# Patient Record
Sex: FEMALE | Race: WHITE | NOT HISPANIC OR LATINO | Employment: PART TIME | URBAN - NONMETROPOLITAN AREA
[De-identification: names, ages, dates, MRNs, and addresses within clinical notes are randomized per-mention and may not be internally consistent; named-entity substitution may affect disease eponyms.]

---

## 2021-05-19 ENCOUNTER — IMMUNIZATIONS (OUTPATIENT)
Dept: FAMILY MEDICINE CLINIC | Facility: HOSPITAL | Age: 41
End: 2021-05-19

## 2021-05-19 DIAGNOSIS — Z23 ENCOUNTER FOR IMMUNIZATION: Primary | ICD-10-CM

## 2021-05-19 PROCEDURE — 91301 SARS-COV-2 / COVID-19 MRNA VACCINE (MODERNA) 100 MCG: CPT

## 2021-05-19 PROCEDURE — 0011A SARS-COV-2 / COVID-19 MRNA VACCINE (MODERNA) 100 MCG: CPT

## 2021-06-17 ENCOUNTER — IMMUNIZATIONS (OUTPATIENT)
Dept: FAMILY MEDICINE CLINIC | Facility: HOSPITAL | Age: 41
End: 2021-06-17

## 2021-06-17 DIAGNOSIS — Z23 ENCOUNTER FOR IMMUNIZATION: Primary | ICD-10-CM

## 2021-06-17 PROCEDURE — 91301 SARS-COV-2 / COVID-19 MRNA VACCINE (MODERNA) 100 MCG: CPT

## 2021-06-17 PROCEDURE — 0012A SARS-COV-2 / COVID-19 MRNA VACCINE (MODERNA) 100 MCG: CPT

## 2021-10-07 ENCOUNTER — NURSE TRIAGE (OUTPATIENT)
Dept: OTHER | Facility: OTHER | Age: 41
End: 2021-10-07

## 2021-10-07 ENCOUNTER — OFFICE VISIT (OUTPATIENT)
Dept: URGENT CARE | Facility: CLINIC | Age: 41
End: 2021-10-07
Payer: COMMERCIAL

## 2021-10-07 VITALS — TEMPERATURE: 97.9 F | RESPIRATION RATE: 18 BRPM | OXYGEN SATURATION: 99 % | HEART RATE: 84 BPM | WEIGHT: 150 LBS

## 2021-10-07 DIAGNOSIS — Z20.822 EXPOSURE TO COVID-19 VIRUS: Primary | ICD-10-CM

## 2021-10-07 DIAGNOSIS — Z11.59 SCREENING FOR VIRAL DISEASE: Primary | ICD-10-CM

## 2021-10-07 PROCEDURE — 99213 OFFICE O/P EST LOW 20 MIN: CPT | Performed by: NURSE PRACTITIONER

## 2021-10-07 PROCEDURE — U0005 INFEC AGEN DETEC AMPLI PROBE: HCPCS | Performed by: NURSE PRACTITIONER

## 2021-10-07 PROCEDURE — U0003 INFECTIOUS AGENT DETECTION BY NUCLEIC ACID (DNA OR RNA); SEVERE ACUTE RESPIRATORY SYNDROME CORONAVIRUS 2 (SARS-COV-2) (CORONAVIRUS DISEASE [COVID-19]), AMPLIFIED PROBE TECHNIQUE, MAKING USE OF HIGH THROUGHPUT TECHNOLOGIES AS DESCRIBED BY CMS-2020-01-R: HCPCS | Performed by: NURSE PRACTITIONER

## 2021-10-07 RX ORDER — LORATADINE 10 MG/1
10 TABLET ORAL DAILY
COMMUNITY

## 2021-10-08 LAB — SARS-COV-2 RNA RESP QL NAA+PROBE: NEGATIVE

## 2021-12-28 ENCOUNTER — OFFICE VISIT (OUTPATIENT)
Dept: URGENT CARE | Facility: CLINIC | Age: 41
End: 2021-12-28
Payer: COMMERCIAL

## 2021-12-28 VITALS — RESPIRATION RATE: 20 BRPM | TEMPERATURE: 98.2 F | HEART RATE: 88 BPM | OXYGEN SATURATION: 98 %

## 2021-12-28 DIAGNOSIS — R68.89 FLU-LIKE SYMPTOMS: Primary | ICD-10-CM

## 2021-12-28 DIAGNOSIS — J02.9 SORE THROAT: ICD-10-CM

## 2021-12-28 LAB — S PYO AG THROAT QL: NEGATIVE

## 2021-12-28 PROCEDURE — 99283 EMERGENCY DEPT VISIT LOW MDM: CPT | Performed by: NURSE PRACTITIONER

## 2021-12-28 PROCEDURE — 87880 STREP A ASSAY W/OPTIC: CPT | Performed by: NURSE PRACTITIONER

## 2021-12-28 PROCEDURE — G0382 LEV 3 HOSP TYPE B ED VISIT: HCPCS | Performed by: NURSE PRACTITIONER

## 2021-12-28 PROCEDURE — 87636 SARSCOV2 & INF A&B AMP PRB: CPT | Performed by: NURSE PRACTITIONER

## 2021-12-28 PROCEDURE — 87070 CULTURE OTHR SPECIMN AEROBIC: CPT | Performed by: NURSE PRACTITIONER

## 2021-12-30 LAB — BACTERIA THROAT CULT: NORMAL

## 2021-12-31 LAB
FLUAV RNA RESP QL NAA+PROBE: NEGATIVE
FLUBV RNA RESP QL NAA+PROBE: NEGATIVE
SARS-COV-2 RNA RESP QL NAA+PROBE: NEGATIVE

## 2022-01-25 ENCOUNTER — HOSPITAL ENCOUNTER (EMERGENCY)
Facility: HOSPITAL | Age: 42
Discharge: HOME | End: 2022-01-25
Attending: EMERGENCY MEDICINE | Admitting: EMERGENCY MEDICINE
Payer: COMMERCIAL

## 2022-01-25 ENCOUNTER — OFFICE VISIT (OUTPATIENT)
Dept: URGENT CARE | Facility: CLINIC | Age: 42
End: 2022-01-25
Payer: COMMERCIAL

## 2022-01-25 VITALS
HEART RATE: 84 BPM | OXYGEN SATURATION: 99 % | SYSTOLIC BLOOD PRESSURE: 166 MMHG | TEMPERATURE: 97.4 F | RESPIRATION RATE: 18 BRPM | DIASTOLIC BLOOD PRESSURE: 95 MMHG

## 2022-01-25 VITALS — RESPIRATION RATE: 20 BRPM | OXYGEN SATURATION: 98 % | TEMPERATURE: 98.1 F | HEART RATE: 82 BPM

## 2022-01-25 DIAGNOSIS — G44.89 OTHER HEADACHE SYNDROME: Primary | ICD-10-CM

## 2022-01-25 DIAGNOSIS — H92.01 ACUTE EAR PAIN, RIGHT: ICD-10-CM

## 2022-01-25 DIAGNOSIS — R22.0 SCALP MASS: ICD-10-CM

## 2022-01-25 DIAGNOSIS — L03.811 CELLULITIS OF SCALP: Primary | ICD-10-CM

## 2022-01-25 DIAGNOSIS — R53.1 WEAKNESS: ICD-10-CM

## 2022-01-25 PROCEDURE — 99284 EMERGENCY DEPT VISIT MOD MDM: CPT | Performed by: EMERGENCY MEDICINE

## 2022-01-25 PROCEDURE — U0003 INFECTIOUS AGENT DETECTION BY NUCLEIC ACID (DNA OR RNA); SEVERE ACUTE RESPIRATORY SYNDROME CORONAVIRUS 2 (SARS-COV-2) (CORONAVIRUS DISEASE [COVID-19]), AMPLIFIED PROBE TECHNIQUE, MAKING USE OF HIGH THROUGHPUT TECHNOLOGIES AS DESCRIBED BY CMS-2020-01-R: HCPCS | Performed by: NURSE PRACTITIONER

## 2022-01-25 PROCEDURE — 99283 EMERGENCY DEPT VISIT LOW MDM: CPT | Performed by: NURSE PRACTITIONER

## 2022-01-25 PROCEDURE — 99283 EMERGENCY DEPT VISIT LOW MDM: CPT

## 2022-01-25 PROCEDURE — U0005 INFEC AGEN DETEC AMPLI PROBE: HCPCS | Performed by: NURSE PRACTITIONER

## 2022-01-25 PROCEDURE — G0382 LEV 3 HOSP TYPE B ED VISIT: HCPCS | Performed by: NURSE PRACTITIONER

## 2022-01-25 RX ORDER — CEPHALEXIN 500 MG/1
500 CAPSULE ORAL EVERY 6 HOURS SCHEDULED
Qty: 28 CAPSULE | Refills: 0 | Status: SHIPPED | OUTPATIENT
Start: 2022-01-25 | End: 2022-02-01

## 2022-01-25 NOTE — PROGRESS NOTES
[facility]        NAME: Ivonne Mcfadden is a 35 y o  female  : 1980    MRN: [id number]  DATE: 2022  TIME: 11:36 AM    Assessment and Plan   Other headache syndrome [G44 89]  1  Other headache syndrome     2  Scalp mass     3  Acute ear pain, right     4  Weakness  COVID Only -Office Collect         Patient Instructions       Follow up with PCP in 3-5 days  Proceed to  ER if symptoms worsen  Your symptoms could be viral or autoimmune  You are to find a family doctor for further work up  If your symptoms are severe to you - you could go to the ED for a higher level of care  The urgent care does not follow patients as primary care providers  Chief Complaint     Chief Complaint   Patient presents with    Headache     x 2 days     Earache     right ear x 2 days     Mass     on head x 3 days          History of Present Illness       This is a 35year old female who just moved here from [place] and has no PCP who states that she had last covid booster 2021  She states that she has had a bump on the back of her head on the right upper posterior area that is painful with and without touching  She states it is a hard lump  She states that she also has pain in the right ear with headache x 2 days  She states she is fatigued  She denies blurred vision, fevers, chills  She states she has been taking ibuprofen w/o relief  Review of Systems   Review of Systems   Constitutional: Negative  HENT: Positive for ear pain  Eyes: Negative  Respiratory: Negative  Cardiovascular: Negative  Gastrointestinal: Negative  Endocrine: Negative  Genitourinary: Negative  Musculoskeletal: Negative  Skin: Positive for wound  Allergic/Immunologic: Negative  Neurological: Positive for headaches  Hematological: Negative  Psychiatric/Behavioral: Negative            Current Medications       Current Outpatient Medications:     levonorgestrel (MIRENA) 20 MCG/24HR IUD, 1 each by Intrauterine route, Disp: , Rfl:     loratadine (CLARITIN) 10 mg tablet, Take 10 mg by mouth daily (Patient not taking: Reported on 10/7/2021), Disp: , Rfl:     Current Allergies     Allergies as of 01/25/2022    (No Known Allergies)            The following portions of the patient's history were reviewed and updated as appropriate: allergies, current medications, past family history, past medical history, past social history, past surgical history and problem list      History reviewed  No pertinent past medical history  History reviewed  No pertinent surgical history  History reviewed  No pertinent family history  Medications have been verified  Objective   Pulse 82   Temp 98 1 °F (36 7 °C)   Resp 20   SpO2 98%   No LMP recorded  Physical Exam     Physical Exam  Vitals and nursing note reviewed  Constitutional:       General: She is not in acute distress  Appearance: Normal appearance  She is normal weight  She is not ill-appearing, toxic-appearing or diaphoretic  HENT:      Head: Normocephalic and atraumatic  Right Ear: Tympanic membrane and ear canal normal       Left Ear: Tympanic membrane and ear canal normal       Nose: Nose normal       Mouth/Throat:      Mouth: Mucous membranes are moist       Pharynx: No posterior oropharyngeal erythema  Eyes:      Extraocular Movements: Extraocular movements intact  Neck:      Comments: No palpable head or neck lymph  Nodes   Cardiovascular:      Rate and Rhythm: Normal rate and regular rhythm  Pulses: Normal pulses  Heart sounds: Normal heart sounds  Pulmonary:      Effort: Pulmonary effort is normal       Breath sounds: Normal breath sounds  Abdominal:      General: There is no distension  Palpations: Abdomen is soft  Tenderness: There is no abdominal tenderness  Musculoskeletal:         General: Normal range of motion        Cervical back: Normal range of motion and neck supple  Lymphadenopathy:      Cervical: No cervical adenopathy  Skin:     General: Skin is warm and dry  Capillary Refill: Capillary refill takes less than 2 seconds  Neurological:      General: No focal deficit present  Mental Status: She is alert and oriented to person, place, and time  Cranial Nerves: No cranial nerve deficit  Sensory: No sensory deficit  Motor: No weakness  Coordination: Coordination normal       Gait: Gait normal       Deep Tendon Reflexes: Reflexes normal    Psychiatric:         Behavior: Behavior normal          Thought Content:  Thought content normal          Judgment: Judgment normal       Comments: Pt crying

## 2022-01-25 NOTE — ED PROVIDER NOTES
History  Chief Complaint   Patient presents with    Mass     pt reports she noted painful lump on back of head  seen at urgent care pt reports she came here due to the pain  reports trying ibuprofen at home  Patient is a 70-year-old female  She presents with a 3 day history of a painful area to her occiput  She denies any trauma  No history of scalp or dermatological problems  No fever or chills  She went to urgent care and was referred here since not getting any better  There is tender and is started to give her headaches  Prior to Admission Medications   Prescriptions Last Dose Informant Patient Reported? Taking?   levonorgestrel (MIRENA) 20 MCG/24HR IUD   Yes No   Si each by Intrauterine route   loratadine (CLARITIN) 10 mg tablet   Yes No   Sig: Take 10 mg by mouth daily   Patient not taking: Reported on 10/7/2021      Facility-Administered Medications: None       History reviewed  No pertinent past medical history  History reviewed  No pertinent surgical history  History reviewed  No pertinent family history  I have reviewed and agree with the history as documented  E-Cigarette/Vaping    E-Cigarette Use Never User      E-Cigarette/Vaping Substances     Social History     Tobacco Use    Smoking status: Current Some Day Smoker    Smokeless tobacco: Never Used   Vaping Use    Vaping Use: Never used   Substance Use Topics    Alcohol use: Not Currently     Comment: social     Drug use: Not on file       Review of Systems   Constitutional: Negative for chills and fever  Skin: Negative for rash and wound  Neurological: Positive for headaches  All other systems reviewed and are negative  Physical Exam  Physical Exam  Vitals reviewed  Constitutional:       General: She is not in acute distress  HENT:      Head: Normocephalic and atraumatic  No raccoon eyes or Bourgeois's sign  Comments: There is a focal area of tenderness and swelling to the occiput    Maybe about the size of a quarter  There is no fluctuance  No drainage  Maybe there is a slight amount of overlying erythema  No palpable skull step-off  Right Ear: Tympanic membrane normal       Left Ear: Tympanic membrane normal    Eyes:      General:         Right eye: No discharge  Left eye: No discharge  Conjunctiva/sclera: Conjunctivae normal    Pulmonary:      Effort: Pulmonary effort is normal  No respiratory distress  Musculoskeletal:         General: No deformity or signs of injury  Cervical back: Normal range of motion and neck supple  No rigidity or tenderness  Lymphadenopathy:      Cervical: No cervical adenopathy  Skin:     General: Skin is warm and dry  Neurological:      General: No focal deficit present  Mental Status: She is alert and oriented to person, place, and time  Psychiatric:         Mood and Affect: Mood normal          Behavior: Behavior normal          Vital Signs  ED Triage Vitals [01/25/22 1234]   Temperature Pulse Respirations Blood Pressure SpO2   (!) 97 4 °F (36 3 °C) 84 18 166/95 99 %      Temp Source Heart Rate Source Patient Position - Orthostatic VS BP Location FiO2 (%)   Tympanic Monitor Sitting Left arm --      Pain Score       8           Vitals:    01/25/22 1234   BP: 166/95   Pulse: 84   Patient Position - Orthostatic VS: Sitting         Visual Acuity      ED Medications  Medications - No data to display    Diagnostic Studies  Results Reviewed     None                 No orders to display              Procedures  Procedures         ED Course                               SBIRT 22yo+      Most Recent Value   SBIRT (22 yo +)    In order to provide better care to our patients, we are screening all of our patients for alcohol and drug use  Would it be okay to ask you these screening questions? Yes Filed at: 01/25/2022 1242   Initial Alcohol Screen: US AUDIT-C     1  How often do you have a drink containing alcohol? 0 Filed at: 01/25/2022 1242   2  How many drinks containing alcohol do you have on a typical day you are drinking? 0 Filed at: 01/25/2022 1242   3b  FEMALE Any Age, or MALE 65+: How often do you have 4 or more drinks on one occassion? 0 Filed at: 01/25/2022 1242   Audit-C Score 0 Filed at: 01/25/2022 1242   KAILEE: How many times in the past year have you    Used an illegal drug or used a prescription medication for non-medical reasons? Never Filed at: 01/25/2022 1242                    [facility]  Number of Diagnoses or Management Options  Diagnosis management comments: Most likely a focal area of cellulitis or possibly dermatitis  Will treat with Keflex and refer to primary MD for further evaluation and treatment  This is not meningitis  This is not a skull fracture  No abscess or cyst drain  Disposition  Final diagnoses:   Cellulitis of scalp     Time reflects when diagnosis was documented in both MDM as applicable and the Disposition within this note     Time User Action Codes Description Comment    1/25/2022 12:49 PM Hannah Thompson Add [E08 [id number]] Cellulitis of scalp       ED Disposition     ED Disposition Condition Date/Time Comment    Discharge Stable Tue Jan 25, 2022 12:49 PM Nile Radish discharge to home/self care  Follow-up Information     Follow up With Specialties Details Why Contact Info Additional Information    [facility] In 1 week  [address] [place] [phone]  [facility] [facility], [address] [place] [address], [phone], [phone]          Patient's Medications   Discharge Prescriptions    CEPHALEXIN (KEFLEX) 500 MG CAPSULE    Take 1 capsule (500 mg total) by mouth every 6 (six) hours for 7 days       Start Date: 1/25/2022 End Date: 2/1/2022       Order Dose: 500 mg       Quantity: 28 capsule    Refills: 0       No discharge procedures on file      PDMP Review       Value Time User    PDMP Reviewed  Yes 1/25/2022 11:32 AM Cherry Pickard, [address] [place] [address]          ED Provider  Electronically Signed by           Philipp Aquino MD  01/25/22 4768

## 2022-01-25 NOTE — PATIENT INSTRUCTIONS
Your symptoms could be viral or autoimmune  You are to find a family doctor for further work up  If your symptoms are severe to you - you could go to the ED for a higher level of care  The urgent care does not follow patients as primary care providers  Acute Headache   WHAT YOU NEED TO KNOW:   An acute headache is pain or discomfort that may start suddenly and get worse quickly  You may have an acute headache only when you feel stress or eat certain foods  Other acute headache pain can happen every day, and sometimes several times a day  DISCHARGE INSTRUCTIONS:   Return to the emergency department if:   · You have severe pain  · You have numbness or weakness on one side of your face or body  · You have a headache that occurs after a blow to the head, a fall, or other trauma  · You have a headache, are forgetful or confused, or have trouble speaking  · You have a headache, stiff neck, and a fever  Call your doctor if:   · You have a constant headache and are vomiting  · You have a headache each day that does not get better, even after treatment  · You have changes in your headaches, or new symptoms that occur when you have a headache  · You have questions or concerns about your condition or care  Medicines: You may need any of the following:  · Prescription pain medicine  may be given  The medicine your healthcare provider recommends will depend on the kind of headaches you have  You will need to take prescription headache medicines as directed to prevent a problem called rebound headache  These headaches happen with regular use of pain relievers for headache disorders  · NSAIDs , such as ibuprofen, help decrease swelling, pain, and fever  This medicine is available with or without a doctor's order  NSAIDs can cause stomach bleeding or kidney problems in certain people  If you take blood thinner medicine, always ask your healthcare provider if NSAIDs are safe for you   Always read the medicine label and follow directions  · Acetaminophen  decreases pain and fever  It is available without a doctor's order  Ask how much to take and how often to take it  Follow directions  Read the labels of all other medicines you are using to see if they also contain acetaminophen, or ask your doctor or pharmacist  Acetaminophen can cause liver damage if not taken correctly  Do not use more than 3 grams (3,000 milligrams) total of acetaminophen in one day  · Antidepressants  may be given for some kinds of headaches  · Take your medicine as directed  Contact your healthcare provider if you think your medicine is not helping or if you have side effects  Tell him or her if you are allergic to any medicine  Keep a list of the medicines, vitamins, and herbs you take  Include the amounts, and when and why you take them  Bring the list or the pill bottles to follow-up visits  Carry your medicine list with you in case of an emergency  Manage your symptoms:   · Apply heat or ice  on the headache area  Use a heat or ice pack  For an ice pack, you can also put crushed ice in a plastic bag  Cover the pack or bag with a towel before you apply it to your skin  Ice and heat both help decrease pain, and heat also helps decrease muscle spasms  Apply heat for 20 to 30 minutes every 2 hours  Apply ice for 15 to 20 minutes every hour  Apply heat or ice for as long and for as many days as directed  You may alternate heat and ice  · Relax your muscles  Lie down in a comfortable position and close your eyes  Relax your muscles slowly  Start at your toes and work your way up your body  · Keep a record of your headaches  Write down when your headaches start and stop  Include your symptoms and what you were doing when the headache began  Record what you ate or drank for 24 hours before the headache started  Describe the pain and where it hurts   Keep track of what you did to treat your headache and if it worked  Prevent an acute headache:   · Avoid anything that triggers an acute headache  Examples include exposure to chemicals, going to high altitude, or not getting enough sleep  Create a regular sleep routine  Go to sleep at the same time and wake up at the same time each day  Do not use electronic devices before bedtime  These may trigger a headache or prevent you from sleeping well  · Do not smoke  Nicotine and other chemicals in cigarettes and cigars can trigger an acute headache or make it worse  Ask your healthcare provider for information if you currently smoke and need help to quit  E-cigarettes or smokeless tobacco still contain nicotine  Talk to your healthcare provider before you use these products  · Limit alcohol as directed  Alcohol can trigger an acute headache or make it worse  If you have cluster headaches, do not drink alcohol during an episode  For other types of headaches, ask your healthcare provider if it is safe for you to drink alcohol  Ask how much is safe for you to drink, and how often  · Exercise as directed  Exercise can reduce tension and help with headache pain  Aim for 30 minutes of physical activity on most days of the week  Your healthcare provider can help you create an exercise plan  · Eat a variety of healthy foods  Healthy foods include fruits, vegetables, low-fat dairy products, lean meats, fish, whole grains, and cooked beans  Your healthcare provider or dietitian can help you create meals plans if you need to avoid foods that trigger headaches  Follow up with your healthcare provider as directed:  Bring your headache record with you when you see your healthcare provider  Write down your questions so you remember to ask them during your visits  © Copyright [employer] 2021 Information is for End User's use only and may not be sold, redistributed or otherwise used for commercial purposes   All illustrations and images included in CareNotes® are the copyrighted property of A D A M , Inc  or [facility] Rosamaria Lord   The above information is an  only  It is not intended as medical advice for individual conditions or treatments  Talk to your doctor, nurse or pharmacist before following any medical regimen to see if it is safe and effective for you

## 2022-01-26 LAB — SARS-COV-2 RNA RESP QL NAA+PROBE: NEGATIVE

## 2022-02-01 ENCOUNTER — PATIENT OUTREACH (OUTPATIENT)
Dept: CASE MANAGEMENT | Facility: HOSPITAL | Age: 42
End: 2022-02-01

## 2022-02-01 NOTE — PROGRESS NOTES
Outpatient Care Management Note:  Patient was identified via report as having a recent non urgent and non life threatening ED visit at [facility]  Chart reviewed  Patient was in the ED on 01/25/22 for evaluation of cellulitis of scalp  Per chart review, patient may not have a PCP, and out of state insurance  Plan to attempt telephonic outreach with the patient to assess for social needs and health status

## 2022-02-04 ENCOUNTER — PATIENT OUTREACH (OUTPATIENT)
Dept: CASE MANAGEMENT | Facility: HOSPITAL | Age: 42
End: 2022-02-04

## 2022-02-04 NOTE — PROGRESS NOTES
Outpatient Care Management Note:  Patient was identified via report as having a recent non urgent and non life threatening ED visit at [address] [place] [address]  Chart reviewed  Patient was in the ED on 01/25/22 for evaluation of cellulitis of scalp  Per chart review, patient may not have a PCP, and out of state insurance  Telephone outreach made to introduce self and role of care management, follow up on general health, outreach for any possible medical or psychosocial services needed and assess for COVID-19 vaccine confidence  Spoke with patient  ED Follow-up Assessment and SDOH Questionnaire completed  1  Do you have an appointment with your primary care provider?  Name of provider: Dr Deann Roth Date and Time of Appointment:   2  Do you have a way to get to your appointments? yes    Health Status:    1  Did you get an after visit summary? yes  2  Did you (or caregiver) read the summary? yes  3  Did you understand it? yes (Te[employer])  4  Do you have any questions about your plan of care? no  5  Do you have any new or worsening symptoms? no   6  Do you know what to do if your symptoms worsen? yes  She reports patient enrolled in [facility] through [employer] when she was living in [place]  She is hopeful to start a new job in 6 weeks and hopes to have insurance soon after that  Interventions: (Add to care plan)  Provided patient with [employer] website information  ED follow up episode will be closed

## 2022-09-14 ENCOUNTER — OFFICE VISIT (OUTPATIENT)
Dept: INTERNAL MEDICINE CLINIC | Facility: CLINIC | Age: 42
End: 2022-09-14
Payer: COMMERCIAL

## 2022-09-14 VITALS
WEIGHT: 159.2 LBS | SYSTOLIC BLOOD PRESSURE: 118 MMHG | TEMPERATURE: 97.8 F | DIASTOLIC BLOOD PRESSURE: 72 MMHG | OXYGEN SATURATION: 100 % | HEART RATE: 85 BPM | HEIGHT: 64 IN | BODY MASS INDEX: 27.18 KG/M2

## 2022-09-14 DIAGNOSIS — F41.9 ANXIETY AND DEPRESSION: ICD-10-CM

## 2022-09-14 DIAGNOSIS — M41.20 OTHER IDIOPATHIC SCOLIOSIS, UNSPECIFIED SPINAL REGION: ICD-10-CM

## 2022-09-14 DIAGNOSIS — Z00.00 ROUTINE ADULT HEALTH MAINTENANCE: Primary | ICD-10-CM

## 2022-09-14 DIAGNOSIS — F32.A ANXIETY AND DEPRESSION: ICD-10-CM

## 2022-09-14 DIAGNOSIS — N62 HYPERTROPHY OF BREAST: ICD-10-CM

## 2022-09-14 DIAGNOSIS — Z12.4 SCREENING FOR CERVICAL CANCER: ICD-10-CM

## 2022-09-14 DIAGNOSIS — Z12.31 ENCOUNTER FOR SCREENING MAMMOGRAM FOR MALIGNANT NEOPLASM OF BREAST: ICD-10-CM

## 2022-09-14 DIAGNOSIS — J30.1 SEASONAL ALLERGIC RHINITIS DUE TO POLLEN: ICD-10-CM

## 2022-09-14 DIAGNOSIS — Z13.6 SCREENING FOR CARDIOVASCULAR CONDITION: ICD-10-CM

## 2022-09-14 PROCEDURE — 99386 PREV VISIT NEW AGE 40-64: CPT | Performed by: NURSE PRACTITIONER

## 2022-09-14 PROCEDURE — 3725F SCREEN DEPRESSION PERFORMED: CPT | Performed by: NURSE PRACTITIONER

## 2022-09-14 RX ORDER — ESCITALOPRAM OXALATE 10 MG/1
10 TABLET ORAL DAILY
Qty: 30 TABLET | Refills: 3 | Status: SHIPPED | OUTPATIENT
Start: 2022-09-14

## 2022-09-14 RX ORDER — DIPHENOXYLATE HYDROCHLORIDE AND ATROPINE SULFATE 2.5; .025 MG/1; MG/1
1 TABLET ORAL DAILY
COMMUNITY

## 2022-09-14 RX ORDER — CYCLOBENZAPRINE HCL 10 MG
10 TABLET ORAL
Qty: 10 TABLET | Refills: 0 | Status: SHIPPED | OUTPATIENT
Start: 2022-09-14

## 2022-09-14 NOTE — PATIENT INSTRUCTIONS
Low Fat Diet   AMBULATORY CARE:   A low-fat diet  is an eating plan that is low in total fat, unhealthy fat, and cholesterol  You may need to follow a low-fat diet if you have trouble digesting or absorbing fat  You may also need to follow this diet if you have high cholesterol  You can also lower your cholesterol by increasing the amount of fiber in your diet  Soluble fiber is a type of fiber that helps to decrease cholesterol levels  Different types of fat in food:   · Limit unhealthy fats  A diet that is high in cholesterol, saturated fat, and trans fat may cause unhealthy cholesterol levels  Unhealthy cholesterol levels increase your risk of heart disease  ? Cholesterol:  Limit intake of cholesterol to less than 200 mg per day  Cholesterol is found in meat, eggs, and dairy  ? Saturated fat:  Limit saturated fat to less than 7% of your total daily calories  Ask your dietitian how many calories you need each day  Saturated fat is found in butter, cheese, ice cream, whole milk, and palm oil  Saturated fat is also found in meat, such as beef, pork, chicken skin, and processed meats  Processed meats include sausage, hot dogs, and bologna  ? Trans fat:  Avoid trans fat as much as possible  Trans fat is used in fried and baked foods  Foods that say trans fat free on the label may still have up to 0 5 grams of trans fat per serving  · Include healthy fats  Replace foods that are high in saturated and trans fat with foods high in healthy fats  This may help to decrease high cholesterol levels  ? Monounsaturated fats: These are found in avocados, nuts, and vegetable oils, such as olive, canola, and sunflower oil  ? Polyunsaturated fats: These can be found in vegetable oils, such as soybean or corn oil  Omega-3 fats can help to decrease the risk of heart disease  Omega-3 fats are found in fish, such as salmon, herring, trout, and tuna   Omega-3 fats can also be found in plant foods, such as walnuts, flaxseed, soybeans, and canola oil  Foods to limit or avoid:   · Grains:      ? Snacks that are made with partially hydrogenated oils, such as chips, regular crackers, and butter-flavored popcorn    ? High-fat baked goods, such as biscuits, croissants, doughnuts, pies, cookies, and pastries    · Dairy:      ? Whole milk, 2% milk, and yogurt and ice cream made with whole milk    ? Half and half creamer, heavy cream, and whipping cream    ? Cheese, cream cheese, and sour cream    · Meats and proteins:      ? High-fat cuts of meat (T-bone steak, regular hamburger, and ribs)    ? Fried meat, poultry (turkey and chicken), and fish    ? Poultry (chicken and turkey) with skin    ? Cold cuts (salami or bologna), hot dogs, collins, and sausage    ? Whole eggs and egg yolks    · Vegetables and fruits with added fat:      ? Fried vegetables or vegetables in butter or high-fat sauces, such as cream or cheese sauces    ? Fried fruit or fruit served with butter or cream    · Fats:      ? Butter, stick margarine, and shortening    ? Coconut, palm oil, and palm kernel oil    Foods to include:   · Grains:      ? Whole-grain breads, cereals, pasta, and brown rice    ? Low-fat crackers and pretzels    · Vegetables and fruits:      ? Fresh, frozen, or canned vegetables (no salt or low-sodium)    ? Fresh, frozen, dried, or canned fruit (canned in light syrup or fruit juice)    ? Avocado    · Low-fat dairy products:      ? Nonfat (skim) or 1% milk    ? Nonfat or low-fat cheese, yogurt, and cottage cheese    · Meats and proteins:      ? Chicken or turkey with no skin    ? Baked or broiled fish    ? Lean beef and pork (loin, round, extra lean hamburger)    ? Beans and peas, unsalted nuts, soy products    ? Egg whites and substitutes    ? Seeds and nuts    · Fats:      ? Unsaturated oil, such as canola, olive, peanut, soybean, or sunflower oil    ? Soft or liquid margarine and vegetable oil spread    ?  Low-fat salad dressing    Other ways to decrease fat:   · Read food labels before you buy foods  Choose foods that have less than 30% of calories from fat  Choose low-fat or fat-free dairy products  Remember that fat free does not mean calorie free  These foods still contain calories, and too many calories can lead to weight gain  · Trim fat from meat and avoid fried food  Trim all visible fat from meat before you cook it  Remove the skin from poultry  Do not gonzalez meat, fish, or poultry  Bake, roast, boil, or broil these foods instead  Avoid fried foods  Eat a baked potato instead of Western Tiarra fries  Steam vegetables instead of sautéing them in butter  · Add less fat to foods  Use imitation collins bits on salads and baked potatoes instead of regular collins bits  Use fat-free or low-fat salad dressings instead of regular dressings  Use low-fat or nonfat butter-flavored topping instead of regular butter or margarine on popcorn and other foods  Ways to decrease fat in recipes:  Replace high-fat ingredients with low-fat or nonfat ones  This may cause baked goods to be drier than usual  You may need to use nonfat cooking spray on pans to prevent food from sticking  You also may need to change the amount of other ingredients, such as water, in the recipe  Try the following:  · Use low-fat or light margarine instead of regular margarine or shortening  · Use lean ground turkey breast or chicken, or lean ground beef (less than 5% fat) instead of hamburger  · Add 1 teaspoon of canola oil to 8 ounces of skim milk instead of using cream or half and half  · Use grated zucchini, carrots, or apples in breads instead of coconut  · Use blenderized, low-fat cottage cheese, plain tofu, or low-fat ricotta cheese instead of cream cheese  · Use 1 egg white and 1 teaspoon of canola oil, or use ¼ cup (2 ounces) of fat-free egg substitute instead of a whole egg       · Replace half of the oil that is called for in a recipe with applesauce when you bake  Use 3 tablespoons of cocoa powder and 1 tablespoon of canola oil instead of a square of baking chocolate  How to increase fiber:  Eat enough high-fiber foods to get 20 to 30 grams of fiber every day  Slowly increase your fiber intake to avoid stomach cramps, gas, and other problems  · Eat 3 ounces of whole-grain foods each day  An ounce is about 1 slice of bread  Eat whole-grain breads, such as whole-wheat bread  Whole wheat, whole-wheat flour, or other whole grains should be listed as the first ingredient on the food label  Replace white flour with whole-grain flour or use half of each in recipes  Whole-grain flour is heavier than white flour, so you may have to add more yeast or baking powder  · Eat a high-fiber cereal for breakfast   Oatmeal is a good source of soluble fiber  Look for cereals that have bran or fiber in the name  Choose whole-grain products, such as brown rice, barley, and whole-wheat pasta  · Eat more beans, peas, and lentils  For example, add beans to soups or salads  Eat at least 5 cups of fruits and vegetables each day  Eat fruits and vegetables with the peel because the peel is high in fiber  © Copyright [address] 2022 Information is for End User's use only and may not be sold, redistributed or otherwise used for commercial purposes  All illustrations and images included in CareNotes® are the copyrighted property of A D A M , Inc  or [address] [place] [address]  The above information is an  only  It is not intended as medical advice for individual conditions or treatments  Talk to your doctor, nurse or pharmacist before following any medical regimen to see if it is safe and effective for you  Heart Healthy Diet   AMBULATORY CARE:   A heart healthy diet  is an eating plan low in unhealthy fats and sodium (salt)  The plan is high in healthy fats and fiber   A heart healthy diet helps improve your cholesterol levels and lowers your risk for heart disease and stroke  A dietitian will teach you how to read and understand food labels  Heart healthy diet guidelines to follow:   · Choose foods that contain healthy fats  ? Unsaturated fats  include monounsaturated and polyunsaturated fats  Unsaturated fat is found in foods such as soybean, canola, olive, corn, and safflower oils  It is also found in soft tub margarine that is made with liquid vegetable oil  ? Omega-3 fat  is found in certain fish, such as salmon, tuna, and trout, and in walnuts and flaxseed  Eat fish high in omega-3 fats at least 2 times a week  · Get 20 to 30 grams of fiber each day  Fruits, vegetables, whole-grain foods, and legumes (cooked beans) are good sources of fiber  · Limit or do not have unhealthy fats  ? Cholesterol  is found in animal foods, such as eggs and lobster, and in dairy products made from whole milk  Limit cholesterol to less than 200 mg each day  ? Saturated fat  is found in meats, such as collins and hamburger  It is also found in chicken or turkey skin, whole milk, and butter  Limit saturated fat to less than 7% of your total daily calories  ? Trans fat  is found in packaged foods, such as potato chips and cookies  It is also in hard margarine, some fried foods, and shortening  Do not eat foods that contain trans fats  · Limit sodium as directed  You may be told to limit sodium to 2,000 to 2,300 mg each day  Choose low-sodium or no-salt-added foods  Add little or no salt to food you prepare  Use herbs and spices in place of salt  Include the following in your heart healthy plan:  Ask your dietitian or healthcare provider how many servings to have from each of the following food groups:  · Grains:      ? Whole-wheat breads, cereals, and pastas, and brown rice    ? Low-fat, low-sodium crackers and chips    · Vegetables:      ? Broccoli, green beans, green peas, and spinach    ? Collards, kale, and lima beans    ?  Carrots, sweet potatoes, tomatoes, and peppers    ? Canned vegetables with no salt added    · Fruits:      ? Bananas, peaches, pears, and pineapple    ? Grapes, raisins, and dates    ? Oranges, tangerines, grapefruit, orange juice, and grapefruit juice    ? Apricots, mangoes, melons, and papaya    ? Raspberries and strawberries    ? Canned fruit with no added sugar    · Low-fat dairy:      ? Nonfat (skim) milk, 1% milk, and low-fat almond, cashew, or soy milks fortified with calcium    ? Low-fat cheese, regular or frozen yogurt, and cottage cheese    · Meats and proteins:      ? Lean cuts of beef and pork (loin, leg, round), skinless chicken and turkey    ? Legumes, soy products, egg whites, or nuts    Limit or do not include the following in your heart healthy plan:   · Unhealthy fats and oils:      ? Whole or 2% milk, cream cheese, sour cream, or cheese    ? High-fat cuts of beef (T-bone steaks, ribs), chicken or turkey with skin, and organ meats such as liver    ? Butter, stick margarine, shortening, and cooking oils such as coconut or palm oil    · Foods and liquids high in sodium:      ? Packaged foods, such as frozen dinners, cookies, macaroni and cheese, and cereals with more than 300 mg of sodium per serving    ? Vegetables with added sodium, such as instant potatoes, vegetables with added sauces, or regular canned vegetables    ? Cured or smoked meats, such as hot dogs, collins, and sausage    ? High-sodium ketchup, barbecue sauce, salad dressing, pickles, olives, soy sauce, or miso    · Foods and liquids high in sugar:      ? Candy, cake, cookies, pies, or doughnuts    ? Soft drinks (soda), sports drinks, or sweetened tea    ? Canned or dry mixes for cakes, soups, sauces, or gravies    Other healthy heart guidelines:   · Do not smoke  Nicotine and other chemicals in cigarettes and cigars can cause lung and heart damage  Ask your healthcare provider for information if you currently smoke and need help to quit  E-cigarettes or smokeless tobacco still contain nicotine  Talk to your healthcare provider before you use these products  · Limit or do not drink alcohol as directed  Alcohol can damage your heart and raise your blood pressure  Your healthcare provider may give you specific daily and weekly limits  The general recommended limit is 1 drink a day for women 21 or older and for men 72 or older  Do not have more than 3 drinks in a day or 7 in a week  The recommended limit is 2 drinks a day for men 24to 59years of age  Do not have more than 4 drinks in a day or 14 in a week  A drink of alcohol is 12 ounces of beer, 5 ounces of wine, or 1½ ounces of liquor  · Exercise regularly  Exercise can help you maintain a healthy weight and improve your blood pressure and cholesterol levels  Regular exercise can also decrease your risk for heart problems  Ask your healthcare provider about the best exercise plan for you  Do not start an exercise program without asking your healthcare provider  Follow up with your doctor or cardiologist as directed:  Write down your questions so you remember to ask them during your visits  © Copyright [employer] 2022 Information is for End User's use only and may not be sold, redistributed or otherwise used for commercial purposes  All illustrations and images included in CareNotes® are the copyrighted property of A D A M , Inc  or [facility] Rosamaria Lord   The above information is an  only  It is not intended as medical advice for individual conditions or treatments  Talk to your doctor, nurse or pharmacist before following any medical regimen to see if it is safe and effective for you

## 2022-09-14 NOTE — PROGRESS NOTES
Name: Zeynep Marcum      : 1980      MRN: [id number]  Encounter Provider: DIPTI Adams  Encounter Date: 2022   Encounter department: [address] [place] [address] W  Will order fasting labs  Will give script for mammogram and GYN  Will order XR of her back  Will refer to plastic surgery  Will start on Lexapro 10 mg daily  Will bring her back in one month or sooner if need be  If any issues was advised to call the office  1  Routine adult health maintenance  -     Comprehensive metabolic panel; Future  -     CBC and differential; Future  -     TSH, 3rd generation with Free T4 reflex; Future    2  Seasonal allergic rhinitis due to pollen  -     Comprehensive metabolic panel; Future  -     CBC and differential; Future  -     TSH, 3rd generation with Free T4 reflex; Future    3  Screening for cervical cancer  -     Ambulatory Referral to Gynecology; Future    4  Encounter for screening mammogram for malignant neoplasm of breast  -     Mammo screening bilateral w 3d & cad; Future; Expected date: 2022    5  Anxiety and depression  -     escitalopram (LEXAPRO) 10 mg tablet; Take 1 tablet (10 mg total) by mouth daily  -     Comprehensive metabolic panel; Future  -     CBC and differential; Future  -     TSH, 3rd generation with Free T4 reflex; Future    6  Screening for cardiovascular condition  -     Lipid panel; Future    7  Other idiopathic scoliosis, unspecified spinal region  -     XR entire spine (scoliosis) 4-5 vw; Future; Expected date: 2022  -     cyclobenzaprine (FLEXERIL) 10 mg tablet; Take 1 tablet (10 mg total) by mouth daily at bedtime    8  Hypertrophy of breast  -     Ambulatory Referral to Plastic Surgery; Future    9  BMI 27 0-27 9,adult         Daniel Alejandre is for a wellness and establish care  She did move her from [place] around one year ago  She has a history of scoliosis and is having some back pain   She states she does sit at home and feels some is from her posture  She would like to have imaging done  She also is having flavio worsening of anxiety and is using CBD oil which did help but is willing to start medication  She denies any chest pain, SOB, or palpitations  She did have a breast reduction done in 1998 and would like to see about getting this done again  She is a D right now and is not sure if her pain is coming from her breasts  She denies any tobacco, alcohol, or drug use  She does have an IUD  She did have her covid vaccines with one booster  She is deferring the Flu vaccine  She offers no other issues  Review of Systems   Musculoskeletal: Positive for back pain  All other systems reviewed and are negative  Current Outpatient Medications on File Prior to Visit   Medication Sig    levonorgestrel (MIRENA) 20 MCG/24HR IUD 1 each by Intrauterine route    loratadine (CLARITIN) 10 mg tablet Take 10 mg by mouth daily    multivitamin (THERAGRAN) TABS Take 1 tablet by mouth daily       Objective     /72 (BP Location: Left arm, Patient Position: Sitting, Cuff Size: Standard)   Pulse 85   Temp 97 8 °F (36 6 °C)   Ht 5' 4" (1 626 m)   Wt 72 2 kg (159 lb 3 2 oz)   SpO2 100%   BMI 27 33 kg/m²     Physical Exam  Vitals reviewed  Constitutional:       Appearance: Normal appearance  She is normal weight  HENT:      Head: Normocephalic and atraumatic  Right Ear: Tympanic membrane, ear canal and external ear normal       Left Ear: Tympanic membrane, ear canal and external ear normal       Nose: Nose normal       Mouth/Throat:      Mouth: Mucous membranes are moist       Pharynx: Oropharynx is clear  Eyes:      Extraocular Movements: Extraocular movements intact  Conjunctiva/sclera: Conjunctivae normal       Pupils: Pupils are equal, round, and reactive to light  Cardiovascular:      Rate and Rhythm: Normal rate and regular rhythm  Pulses: Normal pulses        Heart sounds: Normal heart sounds  Pulmonary:      Effort: Pulmonary effort is normal       Breath sounds: Normal breath sounds  Abdominal:      General: Abdomen is flat  Bowel sounds are normal       Palpations: Abdomen is soft  Musculoskeletal:         General: Normal range of motion  Cervical back: Normal range of motion and neck supple  Skin:     General: Skin is warm and dry  Capillary Refill: Capillary refill takes less than 2 seconds  Neurological:      General: No focal deficit present  Mental Status: She is alert and oriented to person, place, and time  Mental status is at baseline  Psychiatric:         Mood and Affect: Mood normal          Behavior: Behavior normal          Thought Content: Thought content normal          Judgment: Judgment normal        DIPTI Salinas  BMI Counseling: Body mass index is 27 33 kg/m²  The BMI is above normal  Nutrition recommendations include reducing portion sizes, decreasing overall calorie intake, 3-5 servings of fruits/vegetables daily, reducing fast food intake, consuming healthier snacks, decreasing soda and/or juice intake, moderation in carbohydrate intake, increasing intake of lean protein, reducing intake of saturated fat and trans fat and reducing intake of cholesterol

## 2022-09-19 ENCOUNTER — TELEPHONE (OUTPATIENT)
Dept: INTERNAL MEDICINE CLINIC | Facility: CLINIC | Age: 42
End: 2022-09-19

## 2022-09-19 NOTE — TELEPHONE ENCOUNTER
Pili called in that she has been feeling some side effects from the Lexapro that she was put on last week  She stated that she is has episodes of dizziness and nausea  She also stated that she is having a hard time sleeping  I spoke to Dr Swapnil Herrera and she recommended dropping to 5mg and seeing if that will help and to make sure she is taking it in the morning  I informed Pili of this and she stated that she understood  I told Pili to call back later this week and let us know how she is feeling  She stated that she understood and would call back

## 2022-09-21 ENCOUNTER — APPOINTMENT (OUTPATIENT)
Dept: LAB | Facility: HOSPITAL | Age: 42
End: 2022-09-21
Payer: COMMERCIAL

## 2022-09-21 ENCOUNTER — HOSPITAL ENCOUNTER (OUTPATIENT)
Dept: RADIOLOGY | Facility: HOSPITAL | Age: 42
Discharge: HOME | End: 2022-09-21
Payer: COMMERCIAL

## 2022-09-21 DIAGNOSIS — F41.9 ANXIETY AND DEPRESSION: ICD-10-CM

## 2022-09-21 DIAGNOSIS — F32.A ANXIETY AND DEPRESSION: ICD-10-CM

## 2022-09-21 DIAGNOSIS — Z13.6 SCREENING FOR CARDIOVASCULAR CONDITION: ICD-10-CM

## 2022-09-21 DIAGNOSIS — J30.1 SEASONAL ALLERGIC RHINITIS DUE TO POLLEN: ICD-10-CM

## 2022-09-21 DIAGNOSIS — M41.20 OTHER IDIOPATHIC SCOLIOSIS, UNSPECIFIED SPINAL REGION: ICD-10-CM

## 2022-09-21 DIAGNOSIS — Z00.00 ROUTINE ADULT HEALTH MAINTENANCE: ICD-10-CM

## 2022-09-21 LAB
ALBUMIN SERPL BCP-MCNC: 4.2 G/DL (ref 3.5–5)
ALP SERPL-CCNC: 75 U/L (ref 46–116)
ALT SERPL W P-5'-P-CCNC: 24 U/L (ref 12–78)
ANION GAP SERPL CALCULATED.3IONS-SCNC: 6 MMOL/L (ref 4–13)
AST SERPL W P-5'-P-CCNC: 13 U/L (ref 5–45)
BASOPHILS # BLD AUTO: 0.02 THOUSANDS/ΜL (ref 0–0.1)
BASOPHILS NFR BLD AUTO: 0 % (ref 0–1)
BILIRUB SERPL-MCNC: 0.32 MG/DL (ref 0.2–1)
BUN SERPL-MCNC: 7 MG/DL (ref 5–25)
CALCIUM SERPL-MCNC: 9.2 MG/DL (ref 8.3–10.1)
CHLORIDE SERPL-SCNC: 97 MMOL/L (ref 96–108)
CHOLEST SERPL-MCNC: 153 MG/DL
CO2 SERPL-SCNC: 29 MMOL/L (ref 21–32)
CREAT SERPL-MCNC: 0.64 MG/DL (ref 0.6–1.3)
EOSINOPHIL # BLD AUTO: 0.16 THOUSAND/ΜL (ref 0–0.61)
EOSINOPHIL NFR BLD AUTO: 3 % (ref 0–6)
ERYTHROCYTE [DISTWIDTH] IN BLOOD BY AUTOMATED COUNT: 11.6 % (ref 11.6–15.1)
GFR SERPL CREATININE-BSD FRML MDRD: 109 ML/MIN/1.73SQ M
GLUCOSE P FAST SERPL-MCNC: 99 MG/DL (ref 65–99)
HCT VFR BLD AUTO: 39.1 % (ref 34.8–46.1)
HDLC SERPL-MCNC: 57 MG/DL
HGB BLD-MCNC: 13.4 G/DL (ref 11.5–15.4)
IMM GRANULOCYTES # BLD AUTO: 0.03 THOUSAND/UL (ref 0–0.2)
IMM GRANULOCYTES NFR BLD AUTO: 1 % (ref 0–2)
LDLC SERPL CALC-MCNC: 90 MG/DL (ref 0–100)
LYMPHOCYTES # BLD AUTO: 2.62 THOUSANDS/ΜL (ref 0.6–4.47)
LYMPHOCYTES NFR BLD AUTO: 49 % (ref 14–44)
MCH RBC QN AUTO: 31.4 PG (ref 26.8–34.3)
MCHC RBC AUTO-ENTMCNC: 34.3 G/DL (ref 31.4–37.4)
MCV RBC AUTO: 92 FL (ref 82–98)
MONOCYTES # BLD AUTO: 0.41 THOUSAND/ΜL (ref 0.17–1.22)
MONOCYTES NFR BLD AUTO: 8 % (ref 4–12)
NEUTROPHILS # BLD AUTO: 2.07 THOUSANDS/ΜL (ref 1.85–7.62)
NEUTS SEG NFR BLD AUTO: 39 % (ref 43–75)
NONHDLC SERPL-MCNC: 96 MG/DL
NRBC BLD AUTO-RTO: 0 /100 WBCS
PLATELET # BLD AUTO: 298 THOUSANDS/UL (ref 149–390)
PMV BLD AUTO: 9.1 FL (ref 8.9–12.7)
POTASSIUM SERPL-SCNC: 4.1 MMOL/L (ref 3.5–5.3)
PROT SERPL-MCNC: 8 G/DL (ref 6.4–8.4)
RBC # BLD AUTO: 4.27 MILLION/UL (ref 3.81–5.12)
SODIUM SERPL-SCNC: 132 MMOL/L (ref 135–147)
TRIGL SERPL-MCNC: 32 MG/DL
TSH SERPL DL<=0.05 MIU/L-ACNC: 0.84 UIU/ML (ref 0.45–4.5)
WBC # BLD AUTO: 5.31 THOUSAND/UL (ref 4.31–10.16)

## 2022-09-21 PROCEDURE — 85025 COMPLETE CBC W/AUTO DIFF WBC: CPT

## 2022-09-21 PROCEDURE — 72083 X-RAY EXAM ENTIRE SPI 4/5 VW: CPT

## 2022-09-21 PROCEDURE — 80061 LIPID PANEL: CPT

## 2022-09-21 PROCEDURE — 80053 COMPREHEN METABOLIC PANEL: CPT

## 2022-09-21 PROCEDURE — 84443 ASSAY THYROID STIM HORMONE: CPT

## 2022-09-21 PROCEDURE — 36415 COLL VENOUS BLD VENIPUNCTURE: CPT

## 2022-09-28 ENCOUNTER — TELEPHONE (OUTPATIENT)
Dept: PLASTIC SURGERY | Facility: CLINIC | Age: 42
End: 2022-09-28

## 2022-09-28 NOTE — TELEPHONE ENCOUNTER
Lm for patient to call and discuss criteria for breast reduction appointment  Currently one note from 9/14, need 2 more notes and 3 mo of pt/chiro

## 2022-09-30 ENCOUNTER — OFFICE VISIT (OUTPATIENT)
Dept: INTERNAL MEDICINE CLINIC | Facility: CLINIC | Age: 42
End: 2022-09-30
Payer: COMMERCIAL

## 2022-09-30 DIAGNOSIS — J06.9 UPPER RESPIRATORY TRACT INFECTION, UNSPECIFIED TYPE: Primary | ICD-10-CM

## 2022-09-30 LAB
SARS-COV-2 AG UPPER RESP QL IA: NEGATIVE
VALID CONTROL: NORMAL

## 2022-09-30 PROCEDURE — 87811 SARS-COV-2 COVID19 W/OPTIC: CPT | Performed by: NURSE PRACTITIONER

## 2022-09-30 PROCEDURE — 99214 OFFICE O/P EST MOD 30 MIN: CPT | Performed by: NURSE PRACTITIONER

## 2022-09-30 RX ORDER — BENZONATATE 200 MG/1
200 CAPSULE ORAL 3 TIMES DAILY PRN
Qty: 20 CAPSULE | Refills: 0 | Status: SHIPPED | OUTPATIENT
Start: 2022-09-30

## 2022-09-30 RX ORDER — AZITHROMYCIN 250 MG/1
TABLET, FILM COATED ORAL
Qty: 6 TABLET | Refills: 0 | Status: SHIPPED | OUTPATIENT
Start: 2022-09-30 | End: 2022-10-04

## 2022-09-30 NOTE — PROGRESS NOTES
Name: Martell Clement      : 1980      MRN: [id number]  Encounter Provider: DIPTI Jaffe  Encounter Date: 2022   Encounter department: [address] [place] [address]  Rapid covid is negative  Will start on a Z pack and tessalon for cough  Did advise to push fluids and take Tylenol or Motrin for pain or fever  Will follow up as needed  1  Upper respiratory tract infection, unspecified type  -     POCT Rapid Covid Ag  -     azithromycin (ZITHROMAX) 250 mg tablet; Take 2 tablets today then 1 tablet daily x 4 days  -     benzonatate (TESSALON) 200 MG capsule; Take 1 capsule (200 mg total) by mouth 3 (three) times a day as needed for cough         Subjective      Pili is for an acute visit  She is having congestion, sore throat, cough and is having chest burning  She did a home covid test which was negative  She states she is having some SOB  She did notice her blood vessel in her left eye is ruptured  She states she woke up with it like this  She is not sure if its from coughing  She denies any fever  She offers no other issues  Review of Systems   HENT: Positive for congestion and sore throat  Respiratory: Positive for cough and shortness of breath  All other systems reviewed and are negative  Current Outpatient Medications on File Prior to Visit   Medication Sig    cyclobenzaprine (FLEXERIL) 10 mg tablet Take 1 tablet (10 mg total) by mouth daily at bedtime    escitalopram (LEXAPRO) 10 mg tablet Take 1 tablet (10 mg total) by mouth daily    levonorgestrel (MIRENA) 20 MCG/24HR IUD 1 each by Intrauterine route    loratadine (CLARITIN) 10 mg tablet Take 10 mg by mouth daily    multivitamin (THERAGRAN) TABS Take 1 tablet by mouth daily       Objective     There were no vitals taken for this visit  Physical Exam  Vitals reviewed  Constitutional:       Appearance: Normal appearance  She is normal weight     HENT:      Head: Normocephalic and atraumatic  Right Ear: Tympanic membrane, ear canal and external ear normal       Left Ear: Tympanic membrane, ear canal and external ear normal       Nose: Congestion present  Cardiovascular:      Rate and Rhythm: Normal rate and regular rhythm  Pulses: Normal pulses  Heart sounds: Normal heart sounds  Pulmonary:      Effort: Pulmonary effort is normal       Breath sounds: Normal breath sounds  Skin:     General: Skin is warm and dry  Neurological:      General: No focal deficit present  Mental Status: She is alert and oriented to person, place, and time  Mental status is at baseline  Psychiatric:         Mood and Affect: Mood normal          Behavior: Behavior normal          Thought Content:  Thought content normal          Judgment: Judgment normal        DIPTI [place]

## 2022-10-26 ENCOUNTER — OFFICE VISIT (OUTPATIENT)
Dept: INTERNAL MEDICINE CLINIC | Facility: CLINIC | Age: 42
End: 2022-10-26
Payer: COMMERCIAL

## 2022-10-26 VITALS
OXYGEN SATURATION: 97 % | HEART RATE: 74 BPM | SYSTOLIC BLOOD PRESSURE: 120 MMHG | DIASTOLIC BLOOD PRESSURE: 70 MMHG | WEIGHT: 159 LBS | HEIGHT: 64 IN | TEMPERATURE: 97.7 F | BODY MASS INDEX: 27.14 KG/M2

## 2022-10-26 DIAGNOSIS — F32.A ANXIETY AND DEPRESSION: Primary | ICD-10-CM

## 2022-10-26 DIAGNOSIS — F41.9 ANXIETY AND DEPRESSION: Primary | ICD-10-CM

## 2022-10-26 PROCEDURE — 99213 OFFICE O/P EST LOW 20 MIN: CPT | Performed by: NURSE PRACTITIONER

## 2022-10-26 RX ORDER — COVID-19 ANTIGEN TEST
KIT MISCELLANEOUS
COMMUNITY
Start: 2022-09-14

## 2022-10-26 NOTE — PROGRESS NOTES
Name: Marcella Sauceda      : 1980      MRN: [id number]  Encounter Provider: DIPTI Blair  Encounter Date: 10/26/2022   Encounter department: [address] [place] [address] [address] on Lexapro did advise if any issues to call the office  Will follow up as needed  1  Anxiety and depression         Subjective      Pili is for a follow up visit  She is feeling wonderful on Lexapro and feels like her old self  She states her anxiety and depression are much better  She would like to continue on this  She offers no other issues  Review of Systems   All other systems reviewed and are negative  Current Outpatient Medications on File Prior to Visit   Medication Sig   • escitalopram (LEXAPRO) 10 mg tablet Take 1 tablet (10 mg total) by mouth daily   • levonorgestrel (MIRENA) 20 MCG/24HR IUD 1 each by Intrauterine route   • loratadine (CLARITIN) 10 mg tablet Take 10 mg by mouth daily   • multivitamin (THERAGRAN) TABS Take 1 tablet by mouth daily   • benzonatate (TESSALON) 200 MG capsule Take 1 capsule (200 mg total) by mouth 3 (three) times a day as needed for cough (Patient not taking: Reported on 10/26/2022)   • cyclobenzaprine (FLEXERIL) 10 mg tablet Take 1 tablet (10 mg total) by mouth daily at bedtime (Patient not taking: Reported on 10/26/2022)   • Flowflex COVID-19 Ag Home Test KIT USE AS DIRECTED IN BOX (Patient not taking: Reported on 10/26/2022)       Objective     /70 (BP Location: Left arm, Patient Position: Sitting, Cuff Size: Standard)   Pulse 74   Temp 97 7 °F (36 5 °C)   Ht 5' 4" (1 626 m)   Wt 72 1 kg (159 lb)   SpO2 97%   BMI 27 29 kg/m²     Physical Exam  Vitals reviewed  Constitutional:       Appearance: Normal appearance  She is normal weight  Cardiovascular:      Rate and Rhythm: Normal rate and regular rhythm  Pulses: Normal pulses  Heart sounds: Normal heart sounds     Pulmonary:      Effort: Pulmonary effort is normal       Breath sounds: Normal breath sounds  Musculoskeletal:         General: Normal range of motion  Skin:     General: Skin is warm and dry  Capillary Refill: Capillary refill takes less than 2 seconds  Neurological:      General: No focal deficit present  Mental Status: She is alert and oriented to person, place, and time  Mental status is at baseline  Psychiatric:         Mood and Affect: Mood normal          Behavior: Behavior normal          Thought Content:  Thought content normal          Judgment: Judgment normal        DIPTI Rai

## 2022-11-08 DIAGNOSIS — F41.9 ANXIETY AND DEPRESSION: ICD-10-CM

## 2022-11-08 DIAGNOSIS — F32.A ANXIETY AND DEPRESSION: ICD-10-CM

## 2022-11-09 RX ORDER — ESCITALOPRAM OXALATE 10 MG/1
10 TABLET ORAL DAILY
Qty: 90 TABLET | Refills: 3 | Status: SHIPPED | OUTPATIENT
Start: 2022-11-09

## 2022-11-13 PROBLEM — Z00.00 ROUTINE ADULT HEALTH MAINTENANCE: Status: RESOLVED | Noted: 2022-09-14 | Resolved: 2022-11-13

## 2022-11-13 PROBLEM — Z12.4 SCREENING FOR CERVICAL CANCER: Status: RESOLVED | Noted: 2022-09-14 | Resolved: 2022-11-13

## 2022-11-13 PROBLEM — Z13.6 SCREENING FOR CARDIOVASCULAR CONDITION: Status: RESOLVED | Noted: 2022-09-14 | Resolved: 2022-11-13

## 2023-04-25 ENCOUNTER — OFFICE VISIT (OUTPATIENT)
Dept: INTERNAL MEDICINE CLINIC | Facility: CLINIC | Age: 43
End: 2023-04-25

## 2023-04-25 VITALS — HEART RATE: 72 BPM | OXYGEN SATURATION: 97 % | TEMPERATURE: 98.7 F

## 2023-04-25 DIAGNOSIS — J01.10 ACUTE NON-RECURRENT FRONTAL SINUSITIS: Primary | ICD-10-CM

## 2023-04-25 PROBLEM — J06.9 UPPER RESPIRATORY TRACT INFECTION: Status: RESOLVED | Noted: 2022-09-30 | Resolved: 2023-04-25

## 2023-04-25 LAB
SARS-COV-2 AG UPPER RESP QL IA: NEGATIVE
VALID CONTROL: NORMAL

## 2023-04-25 RX ORDER — AMOXICILLIN AND CLAVULANATE POTASSIUM 875; 125 MG/1; MG/1
1 TABLET, FILM COATED ORAL 2 TIMES DAILY
Qty: 14 TABLET | Refills: 0 | Status: SHIPPED | OUTPATIENT
Start: 2023-04-25 | End: 2023-05-02

## 2023-04-25 NOTE — PROGRESS NOTES
Name: Edvin Lazo      : 1980      MRN: [id number]  Encounter Provider: DIPTI Alvarado  Encounter Date: 2023   Encounter department: [address] [place] [address]  Rapid covid is negative  Will start on Augmentin and bactroban ointment to the area  Did advise to watch for any blisters concerning for shingles  None are noted on exam today  Did advise to bump up his Lexapro to 20 mg daily  Will follow up as needed  1  Acute non-recurrent frontal sinusitis  -     amoxicillin-clavulanate (AUGMENTIN) 875-125 mg per tablet; Take 1 tablet by mouth 2 (two) times a day for 7 days  -     mupirocin (BACTROBAN) 2 % ointment; Apply topically 3 (three) times a day  -     POCT Rapid Covid Ag           Subjective      Pili is for an acute visit  She is having pain in the left eye and at times hurts to blink  She is having congestion, her glands are swollen and did notice a sore on her left nare  She states it is red and hurts to touch  She has not taken a covid test  She denies any fever  She also does feel that her anxiety is worse over the past two months  She is taking her Lexapro and is willing to increase this  She offers no other issues  Review of Systems   HENT: Positive for congestion  Eyes: Positive for pain  All other systems reviewed and are negative        Current Outpatient Medications on File Prior to Visit   Medication Sig   • benzonatate (TESSALON) 200 MG capsule Take 1 capsule (200 mg total) by mouth 3 (three) times a day as needed for cough (Patient not taking: Reported on 10/26/2022)   • cyclobenzaprine (FLEXERIL) 10 mg tablet Take 1 tablet (10 mg total) by mouth daily at bedtime (Patient not taking: Reported on 10/26/2022)   • escitalopram (LEXAPRO) 10 mg tablet Take 1 tablet (10 mg total) by mouth daily   • Flowflex COVID-19 Ag Home Test KIT USE AS DIRECTED IN BOX (Patient not taking: Reported on 10/26/2022)   • levonorgestrel (MIRENA) 20 MCG/24HR IUD 1 each by Intrauterine route   • loratadine (CLARITIN) 10 mg tablet Take 10 mg by mouth daily   • multivitamin (THERAGRAN) TABS Take 1 tablet by mouth daily       Objective     Pulse 72   Temp 98 7 °F (37 1 °C)   SpO2 97%     Physical Exam  Vitals reviewed  Constitutional:       Appearance: Normal appearance  She is normal weight  HENT:      Right Ear: Tympanic membrane, ear canal and external ear normal       Left Ear: Tympanic membrane, ear canal and external ear normal       Nose: Nose normal       Comments: Crusted scab noted to left nares     Mouth/Throat:      Mouth: Mucous membranes are moist       Pharynx: Oropharynx is clear  Cardiovascular:      Rate and Rhythm: Normal rate and regular rhythm  Pulses: Normal pulses  Heart sounds: Normal heart sounds  Pulmonary:      Effort: Pulmonary effort is normal       Breath sounds: Normal breath sounds  Skin:     General: Skin is warm and dry  Neurological:      General: No focal deficit present  Mental Status: She is alert and oriented to person, place, and time  Mental status is at baseline  Psychiatric:         Mood and Affect: Mood normal          Behavior: Behavior normal          Thought Content:  Thought content normal          Judgment: Judgment normal        DIPTI Bernal

## 2023-06-22 ENCOUNTER — OFFICE VISIT (OUTPATIENT)
Dept: INTERNAL MEDICINE CLINIC | Facility: CLINIC | Age: 43
End: 2023-06-22
Payer: COMMERCIAL

## 2023-06-22 VITALS
SYSTOLIC BLOOD PRESSURE: 161 MMHG | HEIGHT: 64 IN | TEMPERATURE: 98.6 F | BODY MASS INDEX: 29.52 KG/M2 | OXYGEN SATURATION: 97 % | HEART RATE: 87 BPM | DIASTOLIC BLOOD PRESSURE: 87 MMHG | WEIGHT: 172.9 LBS

## 2023-06-22 DIAGNOSIS — F41.9 ANXIETY AND DEPRESSION: Primary | ICD-10-CM

## 2023-06-22 DIAGNOSIS — F32.A ANXIETY AND DEPRESSION: Primary | ICD-10-CM

## 2023-06-22 PROBLEM — J01.10 ACUTE NON-RECURRENT FRONTAL SINUSITIS: Status: RESOLVED | Noted: 2023-04-25 | Resolved: 2023-06-22

## 2023-06-22 PROBLEM — Z12.31 ENCOUNTER FOR SCREENING MAMMOGRAM FOR MALIGNANT NEOPLASM OF BREAST: Status: RESOLVED | Noted: 2022-09-14 | Resolved: 2023-06-22

## 2023-06-22 PROCEDURE — 99214 OFFICE O/P EST MOD 30 MIN: CPT | Performed by: NURSE PRACTITIONER

## 2023-06-22 RX ORDER — ALPRAZOLAM 0.25 MG/1
TABLET ORAL
Qty: 15 TABLET | Refills: 0 | Status: SHIPPED | OUTPATIENT
Start: 2023-06-22

## 2023-06-22 RX ORDER — ESCITALOPRAM OXALATE 20 MG/1
20 TABLET ORAL DAILY
Qty: 90 TABLET | Refills: 3 | Status: SHIPPED | OUTPATIENT
Start: 2023-06-22

## 2023-06-22 NOTE — PROGRESS NOTES
Name: Cara Marcus      : 1980      MRN: [id number]  Encounter Provider: DIPTI Bar  Encounter Date: 2023   Encounter department: [address] [place] [address], [address]  Will increase her Lexapro to 20 mg daily and will give Xanax PRN  Inquiry done and consistent with prescribing history Will give number to [employer] for counseling  Will follow up in one month or sooner if need be  1  Anxiety and depression  -     escitalopram (LEXAPRO) 20 mg tablet; Take 1 tablet (20 mg total) by mouth daily           Subjective      Pili is for an acute visit  She is having worsening anxiety and depression  She is taking the Lexapro and feels this is helping but is still having social anxiety  She is working from home and feels this is playing a role  She is going to take a leave from work for right now  She was considering quitting her job but her manager did advise her trying this first  She is willing to see therapy  She is having a panic attacks  She at times feels her chest getting tight and heart is racing  She offers no other issues  Review of Systems   Respiratory: Positive for chest tightness  Psychiatric/Behavioral: The patient is nervous/anxious  All other systems reviewed and are negative        Current Outpatient Medications on File Prior to Visit   Medication Sig   • levonorgestrel (MIRENA) 20 MCG/24HR IUD 1 each by Intrauterine route   • loratadine (CLARITIN) 10 mg tablet Take 10 mg by mouth daily   • multivitamin (THERAGRAN) TABS Take 1 tablet by mouth daily   • mupirocin (BACTROBAN) 2 % ointment Apply topically 3 (three) times a day   • [DISCONTINUED] escitalopram (LEXAPRO) 10 mg tablet Take 1 tablet (10 mg total) by mouth daily   • Flowflex COVID-19 Ag Home Test KIT USE AS DIRECTED IN BOX (Patient not taking: Reported on 10/26/2022)   • [DISCONTINUED] benzonatate (TESSALON) 200 MG capsule Take 1 capsule (200 mg total) by mouth 3 (three) times a day as needed for cough (Patient not taking: Reported on 10/26/2022)   • [DISCONTINUED] cyclobenzaprine (FLEXERIL) 10 mg tablet Take 1 tablet (10 mg total) by mouth daily at bedtime (Patient not taking: Reported on 10/26/2022)       Objective     There were no vitals taken for this visit  Physical Exam  Vitals reviewed  Constitutional:       Appearance: Normal appearance  She is normal weight  Cardiovascular:      Rate and Rhythm: Normal rate and regular rhythm  Pulses: Normal pulses  Heart sounds: Normal heart sounds  Pulmonary:      Effort: Pulmonary effort is normal       Breath sounds: Normal breath sounds  Musculoskeletal:         General: Normal range of motion  Skin:     General: Skin is warm and dry  Capillary Refill: Capillary refill takes less than 2 seconds  Neurological:      General: No focal deficit present  Mental Status: She is alert and oriented to person, place, and time  Mental status is at baseline  Psychiatric:         Mood and Affect: Mood normal          Behavior: Behavior normal          Thought Content:  Thought content normal          Judgment: Judgment normal        DIPTI Curiel

## 2023-07-07 ENCOUNTER — TELEPHONE (OUTPATIENT)
Dept: OTHER | Facility: OTHER | Age: 43
End: 2023-07-07

## 2023-07-07 NOTE — TELEPHONE ENCOUNTER
Gab Expose called and is asking if the office can give her a call regarding patient not able to returning to work.

## 2023-08-04 ENCOUNTER — TELEPHONE (OUTPATIENT)
Dept: INTERNAL MEDICINE CLINIC | Facility: CLINIC | Age: 43
End: 2023-08-04

## 2023-08-04 NOTE — TELEPHONE ENCOUNTER
I called and spoke to patient as instructed by Jazz Mccloud asking about [place] papers that she may have for her job. Patient states that the job denied time off according to the information which was put on the form and then she was out of town for a little while. Patient states that it is too late for her to complete the FMLA forms since she was out of town, therefore everything is alright and she appreciates my call.

## 2023-08-19 ENCOUNTER — APPOINTMENT (OUTPATIENT)
Dept: RADIOLOGY | Facility: MEDICAL CENTER | Age: 43
End: 2023-08-19
Payer: COMMERCIAL

## 2023-08-19 ENCOUNTER — OFFICE VISIT (OUTPATIENT)
Dept: URGENT CARE | Facility: MEDICAL CENTER | Age: 43
End: 2023-08-19
Payer: COMMERCIAL

## 2023-08-19 VITALS
HEART RATE: 80 BPM | TEMPERATURE: 97.8 F | OXYGEN SATURATION: 98 % | BODY MASS INDEX: 27.31 KG/M2 | WEIGHT: 160 LBS | RESPIRATION RATE: 20 BRPM | HEIGHT: 64 IN

## 2023-08-19 DIAGNOSIS — M25.472 LEFT ANKLE SWELLING: ICD-10-CM

## 2023-08-19 DIAGNOSIS — M25.472 LEFT ANKLE SWELLING: Primary | ICD-10-CM

## 2023-08-19 DIAGNOSIS — S99.912A INJURY OF LEFT ANKLE, INITIAL ENCOUNTER: ICD-10-CM

## 2023-08-19 PROCEDURE — 99213 OFFICE O/P EST LOW 20 MIN: CPT

## 2023-08-19 PROCEDURE — 73610 X-RAY EXAM OF ANKLE: CPT

## 2023-08-19 PROCEDURE — 73630 X-RAY EXAM OF FOOT: CPT

## 2023-08-19 NOTE — PATIENT INSTRUCTIONS
You may take over the counter Tylenol (Acetaminophen) and/or Motrin (Ibuprofen) as needed, as directed on packaging. Be sure to get plenty of rest, and drinking fluids to remain hydrated. Your xray was read by the provider you saw today in the office as a preliminary result. Your xray will be reviewed and an official reading will be provided by a Radiologist. If you have access to My Chart you can see these results there. Also if there is any significant findings you will be contacted with those results.

## 2023-08-19 NOTE — PROGRESS NOTES
[facility]        NAME: Preeti Bray is a 38 y.o. female  : 1980    MRN: [id number]  DATE: 2023  TIME: 1:40 PM    Assessment and Plan   Left ankle swelling [M25.472]  1. Left ankle swelling  XR foot 3+ vw left    XR ankle 3+ vw left      2. Injury of left ankle, initial encounter  XR foot 3+ vw left    XR ankle 3+ vw left            Patient Instructions       Follow up with PCP in 3-5 days. Proceed to  ER if symptoms worsen. Chief Complaint     Chief Complaint   Patient presents with   • Ankle Injury     Pt. Norbert Mendenhall a week ago , tripped down her steps in back yard, c/o left ankle pain, pt. Does not remember how she injured her ankle but developed immediate swelling, left foot is swollen, treating with tylenol, pt. Is able to bear partial weight , pain located from toes radiating into lower leg          History of Present Illness       Patient fell abotu 1 week ago (past Monday evening) in the rain, in her yard. She doesn't recall specifics of mechanism of injury, but developed immediate swelling and pain in left foot/ankle. She wasn't able to initally bear weight and is now able to bear weight. Continues to have pain and swelling with bruising. She has been taking tylenol when needed- last dose was yesterday afternoon. Also RICE. Review of Systems   Review of Systems   Constitutional: Negative for chills, diaphoresis, fatigue and fever. Respiratory: Negative for cough and shortness of breath. Cardiovascular: Negative for chest pain, palpitations and leg swelling. Musculoskeletal: Positive for arthralgias and gait problem. Skin: Positive for color change.         Bruising           Current Medications       Current Outpatient Medications:   •  ALPRAZolam (XANAX) 0.25 mg tablet, Take one tablet by mouth daily as needed, Disp: 15 tablet, Rfl: 0  •  escitalopram (LEXAPRO) 20 mg tablet, Take 1 tablet (20 mg total) by mouth daily, Disp: 90 tablet, Rfl: 3  • levonorgestrel (MIRENA) 20 MCG/24HR IUD, 1 each by Intrauterine route, Disp: , Rfl:   •  loratadine (CLARITIN) 10 mg tablet, Take 10 mg by mouth daily, Disp: , Rfl:   •  multivitamin (THERAGRAN) TABS, Take 1 tablet by mouth daily, Disp: , Rfl:   •  mupirocin (BACTROBAN) 2 % ointment, Apply topically 3 (three) times a day, Disp: 22 g, Rfl: 0  •  Flowflex COVID-19 Ag Home Test KIT, USE AS DIRECTED IN BOX (Patient not taking: Reported on 10/26/2022), Disp: , Rfl:     Current Allergies     Allergies as of 08/19/2023   • (No Known Allergies)            The following portions of the patient's history were reviewed and updated as appropriate: allergies, current medications, past family history, past medical history, past social history, past surgical history and problem list.     Past Medical History:   Diagnosis Date   • Allergic    • Anxiety    • Depression        Past Surgical History:   Procedure Laterality Date   • REDUCTION MAMMAPLASTY Bilateral 1998       Family History   Problem Relation Age of Onset   • Depression Mother    • Alcohol abuse Mother          Medications have been verified. Objective   Pulse 80   Temp 97.8 °F (36.6 °C)   Resp 20   Ht 5' 4" (1.626 m)   Wt 72.6 kg (160 lb)   LMP 08/12/2023   SpO2 98%   BMI 27.46 kg/m²        Physical Exam     Physical Exam  Vitals and nursing note reviewed. Constitutional:       General: She is not in acute distress. Appearance: Normal appearance. She is normal weight. She is not ill-appearing. HENT:      Head: Normocephalic and atraumatic. Cardiovascular:      Rate and Rhythm: Normal rate and regular rhythm. Pulses: Normal pulses. Heart sounds: Normal heart sounds. Pulmonary:      Effort: Pulmonary effort is normal.      Breath sounds: Normal breath sounds. Abdominal:      General: Abdomen is flat. Bowel sounds are normal.      Palpations: Abdomen is soft.    Musculoskeletal:         General: Swelling, tenderness and signs of injury present. Normal range of motion. Cervical back: Normal range of motion and neck supple. Feet:    Feet:      Comments: Provider Radiology Interpretation (preliminary)   Final results will be as per official Radiology Report when available:   No acute fracture noted. Patient placed in ace wrap for ankle support - follow up with PCP. Skin:     General: Skin is warm. Capillary Refill: Capillary refill takes less than 2 seconds. Neurological:      General: No focal deficit present. Mental Status: She is alert and oriented to person, place, and time.    Psychiatric:         Mood and Affect: Mood normal.         Behavior: Behavior normal.

## 2024-01-16 ENCOUNTER — OFFICE VISIT (OUTPATIENT)
Dept: INTERNAL MEDICINE CLINIC | Facility: CLINIC | Age: 44
End: 2024-01-16

## 2024-01-16 VITALS — TEMPERATURE: 97.6 F | OXYGEN SATURATION: 97 % | HEART RATE: 82 BPM

## 2024-01-16 DIAGNOSIS — J40 BRONCHITIS: Primary | ICD-10-CM

## 2024-01-16 LAB
SARS-COV-2 AG UPPER RESP QL IA: NEGATIVE
SL AMB POCT RAPID FLU A: NEGATIVE
SL AMB POCT RAPID FLU B: NEGATIVE
VALID CONTROL: NORMAL

## 2024-01-16 PROCEDURE — 87811 SARS-COV-2 COVID19 W/OPTIC: CPT | Performed by: NURSE PRACTITIONER

## 2024-01-16 PROCEDURE — 99214 OFFICE O/P EST MOD 30 MIN: CPT | Performed by: NURSE PRACTITIONER

## 2024-01-16 PROCEDURE — 96372 THER/PROPH/DIAG INJ SC/IM: CPT | Performed by: NURSE PRACTITIONER

## 2024-01-16 PROCEDURE — 87804 INFLUENZA ASSAY W/OPTIC: CPT | Performed by: NURSE PRACTITIONER

## 2024-01-16 RX ORDER — DEXAMETHASONE SODIUM PHOSPHATE 4 MG/ML
4 INJECTION, SOLUTION INTRA-ARTICULAR; INTRALESIONAL; INTRAMUSCULAR; INTRAVENOUS; SOFT TISSUE ONCE
Status: COMPLETED | OUTPATIENT
Start: 2024-01-16 | End: 2024-01-16

## 2024-01-16 RX ORDER — CODEINE PHOSPHATE/GUAIFENESIN 10-100MG/5
5 LIQUID (ML) ORAL 3 TIMES DAILY PRN
Qty: 240 ML | Refills: 0 | Status: SHIPPED | OUTPATIENT
Start: 2024-01-16

## 2024-01-16 RX ORDER — DOXYCYCLINE 100 MG/1
100 CAPSULE ORAL 2 TIMES DAILY
Qty: 20 CAPSULE | Refills: 0 | Status: SHIPPED | OUTPATIENT
Start: 2024-01-16 | End: 2024-01-26

## 2024-01-16 RX ORDER — METHYLPREDNISOLONE 4 MG/1
TABLET ORAL
Qty: 21 EACH | Refills: 0 | Status: SHIPPED | OUTPATIENT
Start: 2024-01-16

## 2024-01-16 RX ADMIN — DEXAMETHASONE SODIUM PHOSPHATE 4 MG: 4 INJECTION, SOLUTION INTRA-ARTICULAR; INTRALESIONAL; INTRAMUSCULAR; INTRAVENOUS; SOFT TISSUE at 11:21

## 2024-01-16 NOTE — LETTER
January 16, 2024     Patient: Pili Santana  YOB: 1980  Date of Visit: 1/16/2024      To Whom it May Concern:    Pili Santana is under my professional care. Please excuse Pili from work 01/15/2024 -01/17/2024 . May return to work on 01/18/2024    If you have any questions or concerns, please don't hesitate to call.         Sincerely,          DIPTI Love

## 2024-01-16 NOTE — PROGRESS NOTES
Name: Pili Santana      : 1980      MRN: [id number]  Encounter Provider: DIPTI Love  Encounter Date: 2024   Encounter department: [facility]    Assessment & Plan Rapid flu and covid negative. Will start on Doxy, medrol, and cough elixir. Continue supportive care. Push fluids. Will follow up as needed.     1. Bronchitis  -     doxycycline monohydrate (MONODOX) 100 mg capsule; Take 1 capsule (100 mg total) by mouth 2 (two) times a day for 10 days  -     methylPREDNISolone 4 MG tablet therapy pack; Use as directed on package  -     guaifenesin-codeine (GUAIFENESIN AC) 100-10 MG/5ML liquid; Take 5 mL by mouth 3 (three) times a day as needed for cough  -     POCT Rapid Covid Ag  -     POCT rapid flu A and B           Subjective      Pili is for an acute visit. She is having sore throat, congestion, chest heaviness, and body aches. Her son was sick recently and is not sure if she picked up what he had. She denies any fever. She offers no other issues.      Review of Systems   Constitutional:  Positive for fatigue.   HENT:  Positive for congestion and sore throat.    Respiratory:  Positive for cough.    All other systems reviewed and are negative.      Current Outpatient Medications on File Prior to Visit   Medication Sig    ALPRAZolam (XANAX) 0.25 mg tablet Take one tablet by mouth daily as needed    escitalopram (LEXAPRO) 20 mg tablet Take 1 tablet (20 mg total) by mouth daily    Flowflex COVID-19 Ag Home Test KIT     levonorgestrel (MIRENA) 20 MCG/24HR IUD 1 each by Intrauterine route    loratadine (CLARITIN) 10 mg tablet Take 10 mg by mouth daily    multivitamin (THERAGRAN) TABS Take 1 tablet by mouth daily    mupirocin (BACTROBAN) 2 % ointment Apply topically 3 (three) times a day       Objective     Pulse 82   Temp 97.6 °F (36.4 °C)   SpO2 97%     Physical Exam  HENT:      Right Ear: Tympanic membrane, ear canal and external ear normal.      Left Ear:  Tympanic membrane, ear canal and external ear normal.      Nose: Congestion present.      Mouth/Throat:      Mouth: Mucous membranes are moist.      Pharynx: Oropharynx is clear.   Cardiovascular:      Rate and Rhythm: Regular rhythm.      Pulses: Normal pulses.      Heart sounds: Normal heart sounds.   Pulmonary:      Effort: Pulmonary effort is normal.      Breath sounds: Normal breath sounds.   Skin:     General: Skin is warm and dry.      Capillary Refill: Capillary refill takes less than 2 seconds.   Neurological:      General: No focal deficit present.      Mental Status: She is alert and oriented to person, place, and time. Mental status is at baseline.   Psychiatric:         Mood and Affect: Mood normal.         Thought Content: Thought content normal.         Judgment: Judgment normal.       DIPTI Love

## 2024-06-29 DIAGNOSIS — F32.A ANXIETY AND DEPRESSION: ICD-10-CM

## 2024-06-29 DIAGNOSIS — F41.9 ANXIETY AND DEPRESSION: ICD-10-CM

## 2024-06-29 RX ORDER — ESCITALOPRAM OXALATE 20 MG/1
20 TABLET ORAL DAILY
Qty: 90 TABLET | Refills: 1 | Status: SHIPPED | OUTPATIENT
Start: 2024-06-29

## 2024-09-23 ENCOUNTER — NURSE TRIAGE (OUTPATIENT)
Age: 44
End: 2024-09-23

## 2024-09-23 NOTE — TELEPHONE ENCOUNTER
"Pt warm transferred over by PEP.  Pt has been experiencing her s/s of SOB with coughing, bad HA and bilateral earache since this morning.  Pt states she felt run down yesterday.  Pt cough is productive with green phlegm.  Pt denies fever but does c/o of fatigue.  Pt also c/o chest pressure with breathing and tightness of chest 4-5/10 that is intermittent.  Pt has not yet tested for Covid.  Advised pt to go to  for further evaluation or go do a home covid test and then do virtual visit.  Pt agreeable with plan.      Reason for Disposition   MILD difficulty breathing (e.g., minimal/no SOB at rest, SOB with walking, pulse <100)    Answer Assessment - Initial Assessment Questions  1. COVID-19 DIAGNOSIS: \"Who made your COVID-19 diagnosis?\" \"Was it confirmed by a positive lab test or self-test?\" If not diagnosed by a doctor (or NP/PA), ask \"Are there lots of cases (community spread) where you live?\" Note: See [facility] health department website, if unsure.      No dx yet    2. COVID-19 EXPOSURE: \"Was there any known exposure to COVID before the symptoms began?\" CDC Definition of close contact: within 6 feet (2 meters) for a total of 15 minutes or more over a 24-hour period.       Denies    3. ONSET: \"When did the COVID-19 symptoms start?\"       Today    4. WORST SYMPTOM: \"What is your worst symptom?\" (e.g., cough, fever, shortness of breath, muscle aches)      Cough d/t leading to SOB and b/l ear aches    5. COUGH: \"Do you have a cough?\" If Yes, ask: \"How bad is the cough?\"        Yes, green phlegm with cough    6. FEVER: \"Do you have a fever?\" If Yes, ask: \"What is your temperature, how was it measured, and when did it start?\"      Denies    7. RESPIRATORY STATUS: \"Describe your breathing?\" (e.g., shortness of breath, wheezing, unable to speak)       SOB and wheezy    8. BETTER-SAME-WORSE: \"Are you getting better, staying the same or getting worse compared to yesterday?\"  If getting worse, ask, \"In what way?\"      " "Worse    9. HIGH RISK DISEASE: \"Do you have any chronic medical problems?\" (e.g., asthma, heart or lung disease, weak immune system, obesity, etc.)      Denies    10. VACCINE: \"Have you had the COVID-19 vaccine?\" If Yes, ask: \"Which one, how many shots, when did you get it?\"        2021  11. BOOSTER: \"Have you received your COVID-19 booster?\" If Yes, ask: \"Which one and when did you get it?\"        Denies    12. PREGNANCY: \"Is there any chance you are pregnant?\" \"When was your last menstrual period?\"        Denies    13. OTHER SYMPTOMS: \"Do you have any other symptoms?\"  (e.g., chills, fatigue, headache, loss of smell or taste, muscle pain, sore throat)        Fatigue, HA, b/l ear ache ans SOB and cough.    14. O2 SATURATION MONITOR:  \"Do you use an oxygen saturation monitor (pulse oximeter) at home?\" If Yes, ask \"What is your reading (oxygen level) today?\" \"What is your usual oxygen saturation reading?\" (e.g., 95%)        Yes. Pt is at work but has at home.    Protocols used: Coronavirus (COVID-19) Diagnosed or Suspected-ADULT-[place]    "

## 2024-09-25 ENCOUNTER — OFFICE VISIT (OUTPATIENT)
Dept: INTERNAL MEDICINE CLINIC | Facility: CLINIC | Age: 44
End: 2024-09-25
Payer: COMMERCIAL

## 2024-09-25 VITALS — HEART RATE: 72 BPM | TEMPERATURE: 98.7 F | OXYGEN SATURATION: 99 %

## 2024-09-25 DIAGNOSIS — J06.9 UPPER RESPIRATORY TRACT INFECTION, UNSPECIFIED TYPE: Primary | ICD-10-CM

## 2024-09-25 LAB
SARS-COV-2 AG UPPER RESP QL IA: NEGATIVE
VALID CONTROL: NORMAL

## 2024-09-25 PROCEDURE — 87811 SARS-COV-2 COVID19 W/OPTIC: CPT | Performed by: NURSE PRACTITIONER

## 2024-09-25 PROCEDURE — 99213 OFFICE O/P EST LOW 20 MIN: CPT | Performed by: NURSE PRACTITIONER

## 2024-09-25 RX ORDER — AZITHROMYCIN 250 MG/1
TABLET, FILM COATED ORAL
Qty: 6 TABLET | Refills: 0 | Status: SHIPPED | OUTPATIENT
Start: 2024-09-25 | End: 2024-09-29

## 2024-09-25 NOTE — PROGRESS NOTES
Ambulatory Visit  Name: Pili Santana      : 1980      MRN: [id number]  Encounter Provider: DIPTI Love  Encounter Date: 2024   Encounter department: [facility]    Assessment & Plan  Upper respiratory tract infection, unspecified type    Orders:    azithromycin (ZITHROMAX) 250 mg tablet; Take 2 tablets today then 1 tablet daily x 4 days    POCT Rapid Covid Ag     Rapid covid is negative. Will start on a Z pack take as directed push fluids and take Tylenol or Motrin for pain or fever. Will follow up for her wellness.    History of Present Illness     Pili is for an acute visit. She is having congestion,fatigue, chills and body aches. She did take a covid test Monday which was negative. She           Review of Systems   Constitutional:  Positive for chills and fatigue.   HENT:  Positive for congestion.    Respiratory:  Positive for cough.    All other systems reviewed and are negative.          Objective     There were no vitals taken for this visit.    Physical Exam

## 2024-10-15 ENCOUNTER — OFFICE VISIT (OUTPATIENT)
Dept: INTERNAL MEDICINE CLINIC | Facility: CLINIC | Age: 44
End: 2024-10-15
Payer: COMMERCIAL

## 2024-10-15 VITALS
WEIGHT: 196.13 LBS | OXYGEN SATURATION: 96 % | HEART RATE: 81 BPM | HEIGHT: 65 IN | DIASTOLIC BLOOD PRESSURE: 100 MMHG | SYSTOLIC BLOOD PRESSURE: 160 MMHG | TEMPERATURE: 98.5 F | BODY MASS INDEX: 32.68 KG/M2

## 2024-10-15 DIAGNOSIS — F32.A ANXIETY AND DEPRESSION: ICD-10-CM

## 2024-10-15 DIAGNOSIS — Z12.31 ENCOUNTER FOR SCREENING MAMMOGRAM FOR MALIGNANT NEOPLASM OF BREAST: ICD-10-CM

## 2024-10-15 DIAGNOSIS — Z13.6 SCREENING FOR CARDIOVASCULAR CONDITION: ICD-10-CM

## 2024-10-15 DIAGNOSIS — Z12.11 SCREENING FOR COLON CANCER: ICD-10-CM

## 2024-10-15 DIAGNOSIS — Z00.00 ROUTINE ADULT HEALTH MAINTENANCE: Primary | ICD-10-CM

## 2024-10-15 DIAGNOSIS — Z13.21 ENCOUNTER FOR VITAMIN DEFICIENCY SCREENING: ICD-10-CM

## 2024-10-15 DIAGNOSIS — R53.83 OTHER FATIGUE: ICD-10-CM

## 2024-10-15 DIAGNOSIS — Z23 FLU VACCINE NEED: ICD-10-CM

## 2024-10-15 DIAGNOSIS — F41.9 ANXIETY AND DEPRESSION: ICD-10-CM

## 2024-10-15 PROCEDURE — 90656 IIV3 VACC NO PRSV 0.5 ML IM: CPT | Performed by: NURSE PRACTITIONER

## 2024-10-15 PROCEDURE — 99396 PREV VISIT EST AGE 40-64: CPT | Performed by: NURSE PRACTITIONER

## 2024-10-15 PROCEDURE — 90471 IMMUNIZATION ADMIN: CPT | Performed by: NURSE PRACTITIONER

## 2024-10-15 NOTE — ASSESSMENT & PLAN NOTE
Orders:    influenza vaccine preservative-free 0.5 mL IM (Fluzone, Afluria, Fluarix, Flulaval)    CBC and differential; Future    TSH, 3rd generation with Free T4 reflex; Future

## 2024-10-15 NOTE — PROGRESS NOTES
Ambulatory Visit  Name: Pili Santana      : 1980      MRN: [id number]  Encounter Provider: DIPTI Love  Encounter Date: 10/15/2024   Encounter department: [facility]    Assessment & Plan  Flu vaccine need    Orders:    influenza vaccine preservative-free 0.5 mL IM (Fluzone, Afluria, Fluarix, Flulaval)    CBC and differential; Future    TSH, 3rd generation with Free T4 reflex; Future    Anxiety and depression      Orders:    Comprehensive metabolic panel; Future    CBC and differential; Future    TSH, 3rd generation with Free T4 reflex; Future    Routine adult health maintenance    Orders:    Comprehensive metabolic panel; Future    CBC and differential; Future    TSH, 3rd generation with Free T4 reflex; Future    Screening for cardiovascular condition    Orders:    Lipid panel; Future    Other fatigue    Orders:    Comprehensive metabolic panel; Future    CBC and differential; Future    Iron Panel (Includes Ferritin, Iron Sat%, Iron, and TIBC); Future    Encounter for vitamin deficiency screening    Orders:    Vitamin D 25 hydroxy; Future    Vitamin B12; Future    Encounter for screening mammogram for malignant neoplasm of breast    Orders:    Mammo screening bilateral w 3d and cad; Future    Screening for colon cancer    Orders:    Cologuard    Will give Flu vaccine. Will repeat fasting labs with vitamin levels. Will give script for mammogram and cologuard. BP very high on exam. Will check at home and call with readings. Will follow up then.    Depression Screening and Follow-up Plan: Patient was screened for depression during today's encounter. They screened negative with a PHQ-9 score of 0.    Tobacco Cessation Counseling: The patient is sincerely urged to quit consumption of tobacco. She is ready to quit tobacco.       History of Present Illness     iPli is for a wellness. She is having a lot of fatigue and just not feeling herself. She is having some  "depression and anxiety but nothing out of the norm. She is drinking more than normal at night and feels she wants to cut back on this. She is overdue for GYN and mammogram. She is willing to do a cologuard. She denies any chest pain, SOB, or palpitations. She is willing to get a flu vaccine.          Review of Systems   Constitutional:  Positive for fatigue.   All other systems reviewed and are negative.          Objective     /100 (BP Location: Right arm, Patient Position: Sitting)   Pulse 81   Temp 98.5 °F (36.9 °C) (Tympanic)   Ht 5' 5.35\" (1.66 m)   Wt 89 kg (196 lb 2 oz)   SpO2 96%   BMI 32.28 kg/m²     Physical Exam  Vitals reviewed.   Constitutional:       Appearance: Normal appearance. She is normal weight.   HENT:      Head: Normocephalic and atraumatic.      Right Ear: Tympanic membrane, ear canal and external ear normal.      Left Ear: Tympanic membrane, ear canal and external ear normal.      Nose: Nose normal.      Mouth/Throat:      Mouth: Mucous membranes are moist.      Pharynx: Oropharynx is clear.   Eyes:      Extraocular Movements: Extraocular movements intact.      Conjunctiva/sclera: Conjunctivae normal.      Pupils: Pupils are equal, round, and reactive to light.   Cardiovascular:      Rate and Rhythm: Normal rate.      Pulses: Normal pulses.      Heart sounds: Normal heart sounds.   Pulmonary:      Effort: Pulmonary effort is normal.      Breath sounds: Normal breath sounds.   Abdominal:      General: Abdomen is flat. Bowel sounds are normal.      Palpations: Abdomen is soft.   Musculoskeletal:         General: Normal range of motion.      Cervical back: Normal range of motion and neck supple.   Skin:     General: Skin is warm and dry.      Capillary Refill: Capillary refill takes less than 2 seconds.   Neurological:      General: No focal deficit present.      Mental Status: She is alert and oriented to person, place, and time. Mental status is at baseline.   Psychiatric:         " Mood and Affect: Mood normal.         Behavior: Behavior normal.         Thought Content: Thought content normal.         Judgment: Judgment normal.

## 2024-10-15 NOTE — ASSESSMENT & PLAN NOTE
Orders:    Comprehensive metabolic panel; Future    CBC and differential; Future    TSH, 3rd generation with Free T4 reflex; Future

## 2024-10-15 NOTE — ASSESSMENT & PLAN NOTE
Orders:    Comprehensive metabolic panel; Future    CBC and differential; Future    Iron Panel (Includes Ferritin, Iron Sat%, Iron, and TIBC); Future

## 2024-10-16 DIAGNOSIS — I10 PRIMARY HYPERTENSION: Primary | ICD-10-CM

## 2024-10-16 RX ORDER — VALSARTAN 160 MG/1
160 TABLET ORAL DAILY
Qty: 30 TABLET | Refills: 1 | Status: SHIPPED | OUTPATIENT
Start: 2024-10-16

## 2024-10-16 NOTE — TELEPHONE ENCOUNTER
----- Message from Navya KENDRICK sent at 10/16/2024 11:43 AM EDT -----  Regarding: Patient Reported Blood Pressure  10/16/24 11:43 AM    Patient called or sent a [employer] message to report home blood pressure reading from 10/16/24 as 173/107.    Thank you,  Navya Schmitt [facility]

## 2024-10-16 NOTE — TELEPHONE ENCOUNTER
Received this as a staff message. Providers not in office at this time. I did call Suki at home who advised patient should start on Valsartan 160mg daily. She should continue to check her BP daily, and she should call us with her BP readings on Friday. Patient scheduled for a f/u BP check in 2 weeks.       Medication called into [facility] and left on their prescription line.

## 2024-10-17 ENCOUNTER — APPOINTMENT (OUTPATIENT)
Dept: LAB | Facility: CLINIC | Age: 44
End: 2024-10-17
Payer: COMMERCIAL

## 2024-10-17 ENCOUNTER — TELEPHONE (OUTPATIENT)
Dept: ADMINISTRATIVE | Facility: OTHER | Age: 44
End: 2024-10-17

## 2024-10-17 DIAGNOSIS — R53.83 OTHER FATIGUE: ICD-10-CM

## 2024-10-17 DIAGNOSIS — F41.9 ANXIETY AND DEPRESSION: ICD-10-CM

## 2024-10-17 DIAGNOSIS — Z00.00 ROUTINE ADULT HEALTH MAINTENANCE: ICD-10-CM

## 2024-10-17 DIAGNOSIS — Z23 FLU VACCINE NEED: ICD-10-CM

## 2024-10-17 DIAGNOSIS — F32.A ANXIETY AND DEPRESSION: ICD-10-CM

## 2024-10-17 DIAGNOSIS — Z13.6 SCREENING FOR CARDIOVASCULAR CONDITION: ICD-10-CM

## 2024-10-17 DIAGNOSIS — Z13.21 ENCOUNTER FOR VITAMIN DEFICIENCY SCREENING: ICD-10-CM

## 2024-10-17 LAB
25(OH)D3 SERPL-MCNC: 57.1 NG/ML (ref 30–100)
ALBUMIN SERPL BCG-MCNC: 4.2 G/DL (ref 3.5–5)
ALP SERPL-CCNC: 64 U/L (ref 34–104)
ALT SERPL W P-5'-P-CCNC: 52 U/L (ref 7–52)
ANION GAP SERPL CALCULATED.3IONS-SCNC: 11 MMOL/L (ref 4–13)
AST SERPL W P-5'-P-CCNC: 34 U/L (ref 13–39)
BASOPHILS # BLD AUTO: 0.04 THOUSANDS/ΜL (ref 0–0.1)
BASOPHILS NFR BLD AUTO: 1 % (ref 0–1)
BILIRUB SERPL-MCNC: 0.36 MG/DL (ref 0.2–1)
BUN SERPL-MCNC: 15 MG/DL (ref 5–25)
CALCIUM SERPL-MCNC: 9.1 MG/DL (ref 8.4–10.2)
CHLORIDE SERPL-SCNC: 101 MMOL/L (ref 96–108)
CHOLEST SERPL-MCNC: 183 MG/DL
CO2 SERPL-SCNC: 26 MMOL/L (ref 21–32)
CREAT SERPL-MCNC: 0.66 MG/DL (ref 0.6–1.3)
EOSINOPHIL # BLD AUTO: 0.22 THOUSAND/ΜL (ref 0–0.61)
EOSINOPHIL NFR BLD AUTO: 4 % (ref 0–6)
ERYTHROCYTE [DISTWIDTH] IN BLOOD BY AUTOMATED COUNT: 13 % (ref 11.6–15.1)
FERRITIN SERPL-MCNC: 74 NG/ML (ref 11–307)
GFR SERPL CREATININE-BSD FRML MDRD: 107 ML/MIN/1.73SQ M
GLUCOSE P FAST SERPL-MCNC: 107 MG/DL (ref 65–99)
HCT VFR BLD AUTO: 38 % (ref 34.8–46.1)
HDLC SERPL-MCNC: 48 MG/DL
HGB BLD-MCNC: 12.8 G/DL (ref 11.5–15.4)
IMM GRANULOCYTES # BLD AUTO: 0.04 THOUSAND/UL (ref 0–0.2)
IMM GRANULOCYTES NFR BLD AUTO: 1 % (ref 0–2)
IRON SATN MFR SERPL: 35 % (ref 15–50)
IRON SERPL-MCNC: 132 UG/DL (ref 50–212)
LDLC SERPL CALC-MCNC: 99 MG/DL (ref 0–100)
LYMPHOCYTES # BLD AUTO: 2.19 THOUSANDS/ΜL (ref 0.6–4.47)
LYMPHOCYTES NFR BLD AUTO: 36 % (ref 14–44)
MCH RBC QN AUTO: 33.1 PG (ref 26.8–34.3)
MCHC RBC AUTO-ENTMCNC: 33.7 G/DL (ref 31.4–37.4)
MCV RBC AUTO: 98 FL (ref 82–98)
MONOCYTES # BLD AUTO: 0.51 THOUSAND/ΜL (ref 0.17–1.22)
MONOCYTES NFR BLD AUTO: 8 % (ref 4–12)
NEUTROPHILS # BLD AUTO: 3.16 THOUSANDS/ΜL (ref 1.85–7.62)
NEUTS SEG NFR BLD AUTO: 50 % (ref 43–75)
NONHDLC SERPL-MCNC: 135 MG/DL
NRBC BLD AUTO-RTO: 0 /100 WBCS
PLATELET # BLD AUTO: 301 THOUSANDS/UL (ref 149–390)
PMV BLD AUTO: 9.9 FL (ref 8.9–12.7)
POTASSIUM SERPL-SCNC: 4 MMOL/L (ref 3.5–5.3)
PROT SERPL-MCNC: 7.7 G/DL (ref 6.4–8.4)
RBC # BLD AUTO: 3.87 MILLION/UL (ref 3.81–5.12)
SODIUM SERPL-SCNC: 138 MMOL/L (ref 135–147)
TIBC SERPL-MCNC: 374 UG/DL (ref 250–450)
TRIGL SERPL-MCNC: 179 MG/DL
TSH SERPL DL<=0.05 MIU/L-ACNC: 1.05 UIU/ML (ref 0.45–4.5)
UIBC SERPL-MCNC: 242 UG/DL (ref 155–355)
VIT B12 SERPL-MCNC: 296 PG/ML (ref 180–914)
WBC # BLD AUTO: 6.16 THOUSAND/UL (ref 4.31–10.16)

## 2024-10-17 PROCEDURE — 36415 COLL VENOUS BLD VENIPUNCTURE: CPT

## 2024-10-17 PROCEDURE — 82728 ASSAY OF FERRITIN: CPT

## 2024-10-17 PROCEDURE — 80053 COMPREHEN METABOLIC PANEL: CPT

## 2024-10-17 PROCEDURE — 83540 ASSAY OF IRON: CPT

## 2024-10-17 PROCEDURE — 82607 VITAMIN B-12: CPT

## 2024-10-17 PROCEDURE — 80061 LIPID PANEL: CPT

## 2024-10-17 PROCEDURE — 84443 ASSAY THYROID STIM HORMONE: CPT

## 2024-10-17 PROCEDURE — 85025 COMPLETE CBC W/AUTO DIFF WBC: CPT

## 2024-10-17 PROCEDURE — 82306 VITAMIN D 25 HYDROXY: CPT

## 2024-10-17 PROCEDURE — 83550 IRON BINDING TEST: CPT

## 2024-10-17 NOTE — TELEPHONE ENCOUNTER
----- Message from Navya KENDRICK sent at 10/16/2024 11:43 AM EDT -----  Regarding: Patient Reported Blood Pressure  10/16/24 11:43 AM    Patient called or sent a [employer] message to report home blood pressure reading from 10/16/24 as 173/107.    Thank you,  Navya Schmitt [facility]

## 2024-10-17 NOTE — TELEPHONE ENCOUNTER
Upon review of the In Basket request we have noted that Pt report BP, because of this we are requesting that you forward this request/concern to the appropriate education email address. The Quality team members assigned to this email will be more than happy to assist you.    Any additional questions or concerns should be emailed to the Practice Liaisons via the appropriate education email address, please do not reply via In Basket.    Thank you  Chelo Carter MA   [facility]

## 2024-10-18 ENCOUNTER — OFFICE VISIT (OUTPATIENT)
Dept: INTERNAL MEDICINE CLINIC | Facility: CLINIC | Age: 44
End: 2024-10-18
Payer: COMMERCIAL

## 2024-10-18 ENCOUNTER — TELEPHONE (OUTPATIENT)
Age: 44
End: 2024-10-18

## 2024-10-18 VITALS — SYSTOLIC BLOOD PRESSURE: 130 MMHG | DIASTOLIC BLOOD PRESSURE: 90 MMHG | HEART RATE: 73 BPM

## 2024-10-18 DIAGNOSIS — F41.9 ANXIETY AND DEPRESSION: ICD-10-CM

## 2024-10-18 DIAGNOSIS — F32.A ANXIETY AND DEPRESSION: ICD-10-CM

## 2024-10-18 DIAGNOSIS — I10 PRIMARY HYPERTENSION: Primary | ICD-10-CM

## 2024-10-18 PROCEDURE — 99214 OFFICE O/P EST MOD 30 MIN: CPT | Performed by: NURSE PRACTITIONER

## 2024-10-18 RX ORDER — ALPRAZOLAM 0.25 MG/1
TABLET ORAL
Qty: 15 TABLET | Refills: 0 | Status: SHIPPED | OUTPATIENT
Start: 2024-10-18

## 2024-10-18 NOTE — PROGRESS NOTES
Ambulatory Visit  Name: Pili Santana      : 1980      MRN: [id number]  Encounter Provider: DIPTI Love  Encounter Date: 10/18/2024   Encounter department: [facility]    Assessment & Plan  Primary hypertension         Anxiety and depression      Orders:    ALPRAZolam (XANAX) 0.25 mg tablet; Take one tablet by mouth daily as needed     Will renew Xanax take as directed. Will increase Diovan 320 mg daily. Continue to monitor at home and will bring back in 3 weeks for a BP recheck. \  History of Present Illness     Pili is for an acute visit. She is still checking her Bps at home and they are running high. She is concerned and her anxiety is making her feel worse. She is taking Diovan 160 mg. She does smoke and is trying to cut back. She does drink some caffeine and does like salty foods. She still has some headaches and neck stiffness. She denies any chest pain, SOB, or palpitations. She would like her Xanax renewed. She offers no other issues.          Review of Systems   Neurological:  Positive for headaches.   All other systems reviewed and are negative.          Objective     /90 (BP Location: Left arm, Patient Position: Sitting)   Pulse 73     Physical Exam  Constitutional:       Appearance: Normal appearance. She is normal weight.   Skin:     General: Skin is warm and dry.      Capillary Refill: Capillary refill takes less than 2 seconds.   Neurological:      General: No focal deficit present.      Mental Status: She is alert and oriented to person, place, and time. Mental status is at baseline.   Psychiatric:         Mood and Affect: Mood normal.         Behavior: Behavior normal.         Thought Content: Thought content normal.         Judgment: Judgment normal.

## 2024-10-18 NOTE — TELEPHONE ENCOUNTER
"Patient called with blood pressure readings for today, 10/18/24 as follows:    0730 sitting; R arm 159/99; and    0800 sitting, R arm 177/109.    Patient states she took her blood pressure medication after the first reading.    Patient will be stopping by the office this morning to have blood pressure checked as she is concerned and states her machines \"is all over the place\".  " For information on Fall & Injury Prevention, visit: [email or web address] OR  [email or web address] OR  https://www.cdc.gov/steadi/patient.html

## 2024-10-31 ENCOUNTER — OFFICE VISIT (OUTPATIENT)
Dept: INTERNAL MEDICINE CLINIC | Facility: CLINIC | Age: 44
End: 2024-10-31
Payer: COMMERCIAL

## 2024-10-31 VITALS
BODY MASS INDEX: 31.19 KG/M2 | OXYGEN SATURATION: 98 % | HEIGHT: 65 IN | SYSTOLIC BLOOD PRESSURE: 138 MMHG | TEMPERATURE: 98.5 F | HEART RATE: 86 BPM | DIASTOLIC BLOOD PRESSURE: 71 MMHG | WEIGHT: 187.2 LBS

## 2024-10-31 DIAGNOSIS — F32.A ANXIETY AND DEPRESSION: Primary | ICD-10-CM

## 2024-10-31 DIAGNOSIS — R20.0 NUMBNESS AND TINGLING IN BOTH HANDS: ICD-10-CM

## 2024-10-31 DIAGNOSIS — I10 PRIMARY HYPERTENSION: ICD-10-CM

## 2024-10-31 DIAGNOSIS — M54.2 NECK PAIN: ICD-10-CM

## 2024-10-31 DIAGNOSIS — R20.2 NUMBNESS AND TINGLING IN BOTH HANDS: ICD-10-CM

## 2024-10-31 DIAGNOSIS — F41.9 ANXIETY AND DEPRESSION: Primary | ICD-10-CM

## 2024-10-31 DIAGNOSIS — N63.0 LARGE MASS OF BREAST: ICD-10-CM

## 2024-10-31 PROBLEM — Z23 FLU VACCINE NEED: Status: RESOLVED | Noted: 2024-10-15 | Resolved: 2024-10-31

## 2024-10-31 PROCEDURE — 99214 OFFICE O/P EST MOD 30 MIN: CPT | Performed by: NURSE PRACTITIONER

## 2024-10-31 RX ORDER — UBIDECARENONE 75 MG
CAPSULE ORAL DAILY
COMMUNITY

## 2024-10-31 RX ORDER — VALSARTAN AND HYDROCHLOROTHIAZIDE 320; 12.5 MG/1; MG/1
1 TABLET, FILM COATED ORAL DAILY
Qty: 30 TABLET | Refills: 5 | Status: SHIPPED | OUTPATIENT
Start: 2024-10-31

## 2024-10-31 RX ORDER — CYCLOBENZAPRINE HCL 10 MG
10 TABLET ORAL
Qty: 30 TABLET | Refills: 0 | Status: SHIPPED | OUTPATIENT
Start: 2024-10-31

## 2024-10-31 NOTE — PROGRESS NOTES
"Ambulatory Visit  Name: Pili Santana      : 1980      MRN: [id number]  Encounter Provider: DIPTI Love  Encounter Date: 10/31/2024   Encounter department: [facility]    Assessment & Plan  Anxiety and depression           Primary hypertension    Orders:    valsartan-hydrochlorothiazide (DIOVAN-HCT) 320-12.5 MG per tablet; Take 1 tablet by mouth daily    Numbness and tingling in both hands         Neck pain    Orders:    XR spine cervical complete 4 or 5 vw non injury; Future    cyclobenzaprine (FLEXERIL) 10 mg tablet; Take 1 tablet (10 mg total) by mouth daily at bedtime    Large mass of breast    Orders:    Ambulatory Referral to Plastic Surgery; Future     Will her on Diovan with HCT. Continue to monitor Bps. Will start on Flexeril as needed for muscle spasms. Will order imaging of the neck.  Will refer to plastic surgery. Will follow up in 3 months or sooner if need be.    History of Present Illness     Pili is for a follow up visit. She is doing better and did cut back on drinking and is monitoring her BP. She states she is still having some higher readings. She is having BL hand numbness and tingling and this has been going on for weeks. She is having neck pain and does have a history of scoliosis. She is not sure if this is from her neck or tension. She states it is worse in the am. She is having upper back pain and did have breast reduction when she was around 16. She is having upper back pain now and redness under her breasts. She was not sure how to go about having another reduction. She is right now around a DD. She offers no other issues.          Review of Systems   Musculoskeletal:  Positive for arthralgias, back pain, myalgias and neck pain.   All other systems reviewed and are negative.          Objective     /71   Pulse 86   Temp 98.5 °F (36.9 °C)   Ht 5' 5.35\" (1.66 m)   Wt 84.9 kg (187 lb 3.2 oz)   SpO2 98%   BMI 30.82 kg/m² "     Physical Exam  Vitals reviewed.   Constitutional:       Appearance: She is normal weight.   Cardiovascular:      Rate and Rhythm: Normal rate and regular rhythm.      Pulses: Normal pulses.      Heart sounds: Normal heart sounds.   Pulmonary:      Effort: Pulmonary effort is normal.      Breath sounds: Normal breath sounds.   Musculoskeletal:         General: Tenderness present. Normal range of motion.   Skin:     General: Skin is warm and dry.      Capillary Refill: Capillary refill takes less than 2 seconds.   Neurological:      General: No focal deficit present.      Mental Status: She is alert and oriented to person, place, and time. Mental status is at baseline.   Psychiatric:         Mood and Affect: Mood normal.         Behavior: Behavior normal.         Thought Content: Thought content normal.         Judgment: Judgment normal.

## 2024-10-31 NOTE — ASSESSMENT & PLAN NOTE
Orders:    XR spine cervical complete 4 or 5 vw non injury; Future    cyclobenzaprine (FLEXERIL) 10 mg tablet; Take 1 tablet (10 mg total) by mouth daily at bedtime

## 2024-10-31 NOTE — ASSESSMENT & PLAN NOTE
Orders:    valsartan-hydrochlorothiazide (DIOVAN-HCT) 320-12.5 MG per tablet; Take 1 tablet by mouth daily

## 2024-11-14 PROBLEM — Z13.21 ENCOUNTER FOR VITAMIN DEFICIENCY SCREENING: Status: RESOLVED | Noted: 2024-10-15 | Resolved: 2024-11-14

## 2024-11-14 PROBLEM — Z12.11 SCREENING FOR COLON CANCER: Status: RESOLVED | Noted: 2024-10-15 | Resolved: 2024-11-14

## 2024-11-14 PROBLEM — Z00.00 ROUTINE ADULT HEALTH MAINTENANCE: Status: RESOLVED | Noted: 2024-10-15 | Resolved: 2024-11-14

## 2024-11-20 ENCOUNTER — RESULTS FOLLOW-UP (OUTPATIENT)
Dept: INTERNAL MEDICINE CLINIC | Facility: CLINIC | Age: 44
End: 2024-11-20

## 2024-11-20 LAB — COLOGUARD RESULT REPORTABLE: NEGATIVE

## 2024-11-21 ENCOUNTER — OFFICE VISIT (OUTPATIENT)
Dept: INTERNAL MEDICINE CLINIC | Facility: CLINIC | Age: 44
End: 2024-11-21
Payer: COMMERCIAL

## 2024-11-21 VITALS
HEART RATE: 88 BPM | BODY MASS INDEX: 31.46 KG/M2 | TEMPERATURE: 98.3 F | HEIGHT: 65 IN | DIASTOLIC BLOOD PRESSURE: 65 MMHG | WEIGHT: 188.8 LBS | OXYGEN SATURATION: 97 % | SYSTOLIC BLOOD PRESSURE: 140 MMHG

## 2024-11-21 DIAGNOSIS — M54.2 NECK PAIN: ICD-10-CM

## 2024-11-21 DIAGNOSIS — I10 PRIMARY HYPERTENSION: Primary | ICD-10-CM

## 2024-11-21 DIAGNOSIS — F32.A ANXIETY AND DEPRESSION: ICD-10-CM

## 2024-11-21 DIAGNOSIS — F41.9 ANXIETY AND DEPRESSION: ICD-10-CM

## 2024-11-21 PROCEDURE — 99214 OFFICE O/P EST MOD 30 MIN: CPT | Performed by: NURSE PRACTITIONER

## 2024-11-21 NOTE — PROGRESS NOTES
"Name: Pili Santana      : 1980      MRN: [id number]  Encounter Provider: DIPTI Love  Encounter Date: 2024   Encounter department: [facility]  :  Assessment & Plan  Primary hypertension         Neck pain         Anxiety and depression           Continue Bp medication and monitor Bps. Will have imagind one of her neck. She will call insurance about weight loss benefits. Will follow up in one month if starting medication or 3 months.       History of Present Illness     Pili is for a follow up visit. She is doing well and is watching her Bps at home. She states they are up and down. She is trying to cut back on salt and is trying to exercise. She is working on cutting back on drinking and is drinking more at night. She is interested in something for weight loss and was researching that some of the meds are good for weight loss and her BP. She offers no other issues.      Review of Systems   All other systems reviewed and are negative.         Objective   /65 (BP Location: Right arm)   Pulse 88   Temp 98.3 °F (36.8 °C)   Ht 5' 5.35\" (1.66 m)   Wt 85.6 kg (188 lb 12.8 oz)   SpO2 97%   BMI 31.08 kg/m²      Physical Exam  Vitals reviewed.   Constitutional:       Appearance: Normal appearance. She is obese.   Skin:     General: Skin is warm and dry.      Capillary Refill: Capillary refill takes less than 2 seconds.   Neurological:      General: No focal deficit present.      Mental Status: She is alert and oriented to person, place, and time. Mental status is at baseline.   Psychiatric:         Mood and Affect: Mood normal.         Behavior: Behavior normal.         Thought Content: Thought content normal.         Judgment: Judgment normal.         "

## 2024-11-22 ENCOUNTER — TELEPHONE (OUTPATIENT)
Age: 44
End: 2024-11-22

## 2024-11-22 NOTE — TELEPHONE ENCOUNTER
PA for Semaglutide-Weight Management (WEGOVY) 0.25 MG/0.5ML   NOT REQUIRED     Reason (screenshot if applicable)          Patient advised by          [x] MyChart Message  [] Phone call   []LMOM  []L/M to call office as no active Communication consent on file  []Unable to leave detailed message as VM not approved on Communication consent       Pharmacy advised by    [x]Fax  []Phone call

## 2024-11-22 NOTE — TELEPHONE ENCOUNTER
PA for  Semaglutide-Weight Management (WEGOVY) 0.25 MG/0.5ML  SUBMITTED to [employer]     via    [x]CMM-KEY: [id number]  []Surescripts-Case ID #   []Availity-Auth ID # NDC #   []Faxed to plan   []Other website   []Phone call Case ID #     [x]PA sent as URGENT    All office notes, labs and other pertaining documents and studies sent. Clinical questions answered. Awaiting determination from insurance company.     Turnaround time for your insurance to make a decision on your Prior Authorization can take 7-21 business days.

## 2024-11-25 ENCOUNTER — OFFICE VISIT (OUTPATIENT)
Dept: INTERNAL MEDICINE CLINIC | Facility: CLINIC | Age: 44
End: 2024-11-25
Payer: COMMERCIAL

## 2024-11-25 VITALS — TEMPERATURE: 98.7 F | OXYGEN SATURATION: 97 % | HEART RATE: 88 BPM

## 2024-11-25 DIAGNOSIS — J40 TRACHEOBRONCHITIS: Primary | ICD-10-CM

## 2024-11-25 PROCEDURE — 99213 OFFICE O/P EST LOW 20 MIN: CPT | Performed by: FAMILY MEDICINE

## 2024-11-25 RX ORDER — BENZONATATE 200 MG/1
200 CAPSULE ORAL 3 TIMES DAILY PRN
Qty: 30 CAPSULE | Refills: 0 | Status: SHIPPED | OUTPATIENT
Start: 2024-11-25

## 2024-11-25 RX ORDER — PREDNISONE 10 MG/1
40 TABLET ORAL DAILY
Qty: 20 TABLET | Refills: 0 | Status: SHIPPED | OUTPATIENT
Start: 2024-11-25 | End: 2024-11-30

## 2024-11-25 RX ORDER — AZITHROMYCIN 250 MG/1
TABLET, FILM COATED ORAL
Qty: 6 TABLET | Refills: 1 | Status: SHIPPED | OUTPATIENT
Start: 2024-11-25 | End: 2024-11-30

## 2024-11-26 NOTE — PROGRESS NOTES
Name: Pili Santana      : 1980      MRN: [id number]  Encounter Provider: Maria Del Rosario Azar MD  Encounter Date: 2024   Encounter department: [facility]  :  Assessment & Plan  Tracheobronchitis    Orders:    azithromycin (ZITHROMAX) 250 mg tablet; 2 pills today, then one daily for 4 more days    predniSONE 10 mg tablet; Take 4 tablets (40 mg total) by mouth daily for 5 days    benzonatate (TESSALON) 200 MG capsule; Take 1 capsule (200 mg total) by mouth 3 (three) times a day as needed for cough    Orders and recommendations as noted above.  Discussed possible causes for symptoms with her.  Was around her grandchildren who were sick with similar symptoms.  Likely the same illness.  Discussed the possibility of mycoplasma especially since her grandchild had pneumonia.  Fluids.  Rest.  Z-Wilfredo as noted above.  Prednisone as noted above.  Tessalon for cough.  Note given for work.  Call or follow-up if symptoms persist or worsen.       History of Present Illness     She presents for acute visit.  Started over the last few days with a cough and some chest congestion.  Has also had a sore throat as well as some pain into her ears.  Has felt short of breath at times.  More fatigued.  Denies fevers.  Was around her grandchildren who are both sick.  One of the grandchildren was diagnosed with a pneumonia.      Review of Systems   Constitutional:  Positive for activity change and fatigue. Negative for fever and unexpected weight change.   HENT:  Positive for congestion, ear pain, postnasal drip and sore throat.    Respiratory:  Positive for cough.    Cardiovascular:  Negative for chest pain and palpitations.   Gastrointestinal:  Negative for abdominal pain, diarrhea, nausea and vomiting.   Musculoskeletal:  Negative for arthralgias.   Skin:  Negative for rash.   Allergic/Immunologic: Negative for immunocompromised state.   Neurological:  Negative for light-headedness.     Medical  History Reviewed by provider this encounter:     .  Past Medical History   Past Medical History:   Diagnosis Date    Allergic     Anxiety     Depression      Past Surgical History:   Procedure Laterality Date    REDUCTION MAMMAPLASTY Bilateral 1998     Family History   Problem Relation Age of Onset    Depression Mother     Alcohol abuse Mother       reports that she has been smoking cigarettes. She has never been exposed to tobacco smoke. She has never used smokeless tobacco. She reports current alcohol use. She reports that she does not currently use drugs.  Current Outpatient Medications on File Prior to Visit   Medication Sig Dispense Refill    ALPRAZolam (XANAX) 0.25 mg tablet Take one tablet by mouth daily as needed 15 tablet 0    cyanocobalamin (VITAMIN B-12) 100 mcg tablet Take by mouth daily      cyclobenzaprine (FLEXERIL) 10 mg tablet Take 1 tablet (10 mg total) by mouth daily at bedtime 30 tablet 0    escitalopram (LEXAPRO) 20 mg tablet TAKE 1 TABLET BY MOUTH EVERY DAY 90 tablet 1    levonorgestrel (MIRENA) 20 MCG/24HR IUD 1 each by Intrauterine route      loratadine (CLARITIN) 10 mg tablet Take 10 mg by mouth daily      multivitamin (THERAGRAN) TABS Take 1 tablet by mouth daily      mupirocin (BACTROBAN) 2 % ointment Apply topically 3 (three) times a day (Patient not taking: Reported on 11/21/2024) 22 g 0    Semaglutide-Weight Management (WEGOVY) 0.25 MG/0.5ML Inject 0.5 mL (0.25 mg total) under the skin once a week 2 mL 0    valsartan-hydrochlorothiazide (DIOVAN-HCT) 320-12.5 MG per tablet Take 1 tablet by mouth daily 30 tablet 5     No current facility-administered medications on file prior to visit.   No Known Allergies   Current Outpatient Medications on File Prior to Visit   Medication Sig Dispense Refill    ALPRAZolam (XANAX) 0.25 mg tablet Take one tablet by mouth daily as needed 15 tablet 0    cyanocobalamin (VITAMIN B-12) 100 mcg tablet Take by mouth daily      cyclobenzaprine (FLEXERIL) 10 mg  tablet Take 1 tablet (10 mg total) by mouth daily at bedtime 30 tablet 0    escitalopram (LEXAPRO) 20 mg tablet TAKE 1 TABLET BY MOUTH EVERY DAY 90 tablet 1    levonorgestrel (MIRENA) 20 MCG/24HR IUD 1 each by Intrauterine route      loratadine (CLARITIN) 10 mg tablet Take 10 mg by mouth daily      multivitamin (THERAGRAN) TABS Take 1 tablet by mouth daily      mupirocin (BACTROBAN) 2 % ointment Apply topically 3 (three) times a day (Patient not taking: Reported on 11/21/2024) 22 g 0    Semaglutide-Weight Management (WEGOVY) 0.25 MG/0.5ML Inject 0.5 mL (0.25 mg total) under the skin once a week 2 mL 0    valsartan-hydrochlorothiazide (DIOVAN-HCT) 320-12.5 MG per tablet Take 1 tablet by mouth daily 30 tablet 5     No current facility-administered medications on file prior to visit.      Social History     Tobacco Use    Smoking status: Some Days     Current packs/day: 0.25     Types: Cigarettes     Passive exposure: Never    Smokeless tobacco: Never   Vaping Use    Vaping status: Never Used   Substance and Sexual Activity    Alcohol use: Yes     Comment: social     Drug use: Not Currently    Sexual activity: Yes     Partners: Male     Birth control/protection: I.U.D.        Objective   There were no vitals taken for this visit.     Physical Exam  Vitals reviewed.   Constitutional:       General: She is not in acute distress.     Appearance: She is well-developed and well-groomed.   HENT:      Head:      Comments: Moist mucous membranes; boggy nasal mucosa with clear nasal discharge; slight posterior pharyngeal erythema; slight effusions bilateral tympanic membranes  Cardiovascular:      Rate and Rhythm: Normal rate and regular rhythm.      Heart sounds: No murmur heard.  Pulmonary:      Breath sounds: Wheezing and rhonchi present.      Comments: Scattered rhonchi with slight expiratory wheeze  Musculoskeletal:      Cervical back: Neck supple.   Neurological:      Mental Status: She is alert.   Psychiatric:          Behavior: Behavior is cooperative.

## 2024-11-26 NOTE — ASSESSMENT & PLAN NOTE
Orders:    azithromycin (ZITHROMAX) 250 mg tablet; 2 pills today, then one daily for 4 more days    predniSONE 10 mg tablet; Take 4 tablets (40 mg total) by mouth daily for 5 days    benzonatate (TESSALON) 200 MG capsule; Take 1 capsule (200 mg total) by mouth 3 (three) times a day as needed for cough

## 2024-12-02 ENCOUNTER — TELEPHONE (OUTPATIENT)
Age: 44
End: 2024-12-02

## 2024-12-02 DIAGNOSIS — J40 TRACHEOBRONCHITIS: Primary | ICD-10-CM

## 2024-12-02 NOTE — TELEPHONE ENCOUNTER
Patient was seen last week for 'walking pneumonia' by Dr. Azar.  She just finished a z-matt and steroids.  She says that she still feels sick - a lot of coughing and congestion, earache - everything except fever.  Is there anything else she can take?  Please contact patient and advise.  Thank you.

## 2024-12-03 ENCOUNTER — HOSPITAL ENCOUNTER (OUTPATIENT)
Dept: RADIOLOGY | Facility: HOSPITAL | Age: 44
Discharge: HOME | End: 2024-12-03
Payer: COMMERCIAL

## 2024-12-03 ENCOUNTER — OFFICE VISIT (OUTPATIENT)
Dept: INTERNAL MEDICINE CLINIC | Facility: CLINIC | Age: 44
End: 2024-12-03
Payer: COMMERCIAL

## 2024-12-03 ENCOUNTER — APPOINTMENT (OUTPATIENT)
Dept: LAB | Facility: HOSPITAL | Age: 44
End: 2024-12-03
Payer: COMMERCIAL

## 2024-12-03 DIAGNOSIS — J01.10 ACUTE NON-RECURRENT FRONTAL SINUSITIS: Primary | ICD-10-CM

## 2024-12-03 DIAGNOSIS — M54.2 NECK PAIN: ICD-10-CM

## 2024-12-03 DIAGNOSIS — B00.1 HERPES LABIALIS: ICD-10-CM

## 2024-12-03 DIAGNOSIS — J40 TRACHEOBRONCHITIS: ICD-10-CM

## 2024-12-03 LAB
ALBUMIN SERPL BCG-MCNC: 3.9 G/DL (ref 3.5–5)
ALP SERPL-CCNC: 58 U/L (ref 34–104)
ALT SERPL W P-5'-P-CCNC: 29 U/L (ref 7–52)
ANION GAP SERPL CALCULATED.3IONS-SCNC: 9 MMOL/L (ref 4–13)
AST SERPL W P-5'-P-CCNC: 15 U/L (ref 13–39)
BASOPHILS # BLD AUTO: 0.05 THOUSANDS/ΜL (ref 0–0.1)
BASOPHILS NFR BLD AUTO: 1 % (ref 0–1)
BILIRUB SERPL-MCNC: 0.34 MG/DL (ref 0.2–1)
BUN SERPL-MCNC: 17 MG/DL (ref 5–25)
CALCIUM SERPL-MCNC: 8.3 MG/DL (ref 8.4–10.2)
CHLORIDE SERPL-SCNC: 98 MMOL/L (ref 96–108)
CO2 SERPL-SCNC: 26 MMOL/L (ref 21–32)
CREAT SERPL-MCNC: 0.77 MG/DL (ref 0.6–1.3)
EOSINOPHIL # BLD AUTO: 0.15 THOUSAND/ΜL (ref 0–0.61)
EOSINOPHIL NFR BLD AUTO: 2 % (ref 0–6)
ERYTHROCYTE [DISTWIDTH] IN BLOOD BY AUTOMATED COUNT: 12.3 % (ref 11.6–15.1)
GFR SERPL CREATININE-BSD FRML MDRD: 92 ML/MIN/1.73SQ M
GLUCOSE P FAST SERPL-MCNC: 88 MG/DL (ref 65–99)
HCT VFR BLD AUTO: 35.9 % (ref 34.8–46.1)
HGB BLD-MCNC: 12.3 G/DL (ref 11.5–15.4)
IMM GRANULOCYTES # BLD AUTO: 0.21 THOUSAND/UL (ref 0–0.2)
IMM GRANULOCYTES NFR BLD AUTO: 2 % (ref 0–2)
LYMPHOCYTES # BLD AUTO: 4.44 THOUSANDS/ΜL (ref 0.6–4.47)
LYMPHOCYTES NFR BLD AUTO: 50 % (ref 14–44)
MCH RBC QN AUTO: 32.5 PG (ref 26.8–34.3)
MCHC RBC AUTO-ENTMCNC: 34.3 G/DL (ref 31.4–37.4)
MCV RBC AUTO: 95 FL (ref 82–98)
MONOCYTES # BLD AUTO: 0.59 THOUSAND/ΜL (ref 0.17–1.22)
MONOCYTES NFR BLD AUTO: 7 % (ref 4–12)
NEUTROPHILS # BLD AUTO: 3.34 THOUSANDS/ΜL (ref 1.85–7.62)
NEUTS SEG NFR BLD AUTO: 38 % (ref 43–75)
NRBC BLD AUTO-RTO: 0 /100 WBCS
PLATELET # BLD AUTO: 340 THOUSANDS/UL (ref 149–390)
PMV BLD AUTO: 8.8 FL (ref 8.9–12.7)
POTASSIUM SERPL-SCNC: 4 MMOL/L (ref 3.5–5.3)
PROT SERPL-MCNC: 6.5 G/DL (ref 6.4–8.4)
RBC # BLD AUTO: 3.78 MILLION/UL (ref 3.81–5.12)
SARS-COV-2 AG UPPER RESP QL IA: NEGATIVE
SODIUM SERPL-SCNC: 133 MMOL/L (ref 135–147)
VALID CONTROL: NORMAL
WBC # BLD AUTO: 8.78 THOUSAND/UL (ref 4.31–10.16)

## 2024-12-03 PROCEDURE — 36415 COLL VENOUS BLD VENIPUNCTURE: CPT

## 2024-12-03 PROCEDURE — 85025 COMPLETE CBC W/AUTO DIFF WBC: CPT

## 2024-12-03 PROCEDURE — 72050 X-RAY EXAM NECK SPINE 4/5VWS: CPT

## 2024-12-03 PROCEDURE — 96372 THER/PROPH/DIAG INJ SC/IM: CPT | Performed by: NURSE PRACTITIONER

## 2024-12-03 PROCEDURE — 87811 SARS-COV-2 COVID19 W/OPTIC: CPT | Performed by: NURSE PRACTITIONER

## 2024-12-03 PROCEDURE — 99214 OFFICE O/P EST MOD 30 MIN: CPT | Performed by: NURSE PRACTITIONER

## 2024-12-03 PROCEDURE — 80053 COMPREHEN METABOLIC PANEL: CPT

## 2024-12-03 PROCEDURE — 71046 X-RAY EXAM CHEST 2 VIEWS: CPT

## 2024-12-03 RX ORDER — VALACYCLOVIR HYDROCHLORIDE 1 G/1
TABLET, FILM COATED ORAL
Qty: 30 TABLET | Refills: 0 | Status: SHIPPED | OUTPATIENT
Start: 2024-12-03 | End: 2024-12-03

## 2024-12-03 RX ORDER — PREDNISONE 10 MG/1
TABLET ORAL
Qty: 18 TABLET | Refills: 0 | Status: SHIPPED | OUTPATIENT
Start: 2024-12-03

## 2024-12-03 RX ORDER — DEXAMETHASONE SODIUM PHOSPHATE 4 MG/ML
4 INJECTION, SOLUTION INTRA-ARTICULAR; INTRALESIONAL; INTRAMUSCULAR; INTRAVENOUS; SOFT TISSUE ONCE
Status: COMPLETED | OUTPATIENT
Start: 2024-12-03 | End: 2024-12-03

## 2024-12-03 RX ORDER — DOXYCYCLINE 100 MG/1
100 CAPSULE ORAL 2 TIMES DAILY
Qty: 20 CAPSULE | Refills: 0 | Status: SHIPPED | OUTPATIENT
Start: 2024-12-03 | End: 2024-12-13

## 2024-12-03 RX ADMIN — DEXAMETHASONE SODIUM PHOSPHATE 4 MG: 4 INJECTION, SOLUTION INTRA-ARTICULAR; INTRALESIONAL; INTRAMUSCULAR; INTRAVENOUS; SOFT TISSUE at 13:01

## 2024-12-03 NOTE — ASSESSMENT & PLAN NOTE
Orders:    doxycycline monohydrate (MONODOX) 100 mg capsule; Take 1 capsule (100 mg total) by mouth 2 (two) times a day for 10 days    predniSONE 10 mg tablet; 30 mg by mouth daily for 3 days, then 20 mg by mouth daily for 3 days, then 10 mg by mouth daily for 3 days, then stop

## 2024-12-03 NOTE — PROGRESS NOTES
Name: Pili Santana      : 1980      MRN: [id number]  Encounter Provider: DIPTI Love  Encounter Date: 12/3/2024   Encounter department: [facility]  :  Assessment & Plan  Acute non-recurrent frontal sinusitis    Orders:    doxycycline monohydrate (MONODOX) 100 mg capsule; Take 1 capsule (100 mg total) by mouth 2 (two) times a day for 10 days    predniSONE 10 mg tablet; 30 mg by mouth daily for 3 days, then 20 mg by mouth daily for 3 days, then 10 mg by mouth daily for 3 days, then stop    Herpes labialis    Orders:    valACYclovir (VALTREX) 1,000 mg tablet; Take two tablets by mouth now may repeat one tablet by mouth in 12 hours      Will give Decadron now IM. Will start on Doxy and prednisone tapered dose. Will call in Valtrex for cold sore. Did have labs and CXR which were normal. Will follow up as needed.     History of Present Illness     Pili is for an acute visit. She was sick since last week and was on steroids and a Z pack. She is still having congestion, sore throat, and no energy. She is concerned due to have she is feeling. She is not sure if she can go to work. She denies any fever. She offers no other issues.      Review of Systems   HENT:  Positive for congestion, sinus pressure, sinus pain and sore throat.    All other systems reviewed and are negative.         Objective   There were no vitals taken for this visit.     Physical Exam  Vitals reviewed.   Constitutional:       Appearance: Normal appearance. She is normal weight.   HENT:      Right Ear: Tympanic membrane, ear canal and external ear normal.      Left Ear: Tympanic membrane, ear canal and external ear normal.      Nose: Congestion present.      Mouth/Throat:      Mouth: Mucous membranes are moist.      Pharynx: Oropharynx is clear.   Cardiovascular:      Rate and Rhythm: Normal rate and regular rhythm.      Pulses: Normal pulses.      Heart sounds: Normal heart sounds.   Pulmonary:       Effort: Pulmonary effort is normal.      Breath sounds: Normal breath sounds.   Skin:     General: Skin is warm and dry.      Capillary Refill: Capillary refill takes less than 2 seconds.   Neurological:      General: No focal deficit present.      Mental Status: She is alert and oriented to person, place, and time. Mental status is at baseline.   Psychiatric:         Mood and Affect: Mood normal.         Behavior: Behavior normal.         Thought Content: Thought content normal.         Judgment: Judgment normal.

## 2024-12-03 NOTE — ASSESSMENT & PLAN NOTE
Orders:    valACYclovir (VALTREX) 1,000 mg tablet; Take two tablets by mouth now may repeat one tablet by mouth in 12 hours

## 2024-12-05 ENCOUNTER — TELEPHONE (OUTPATIENT)
Age: 44
End: 2024-12-05

## 2024-12-05 NOTE — TELEPHONE ENCOUNTER
Pt called, was in to see pcp on 12/3. She is still not feeling well, was hoping to extend her excuse note for work. Is hoping to extend it to 12/6 and return to work on 12/9. Please advise and notify pt.

## 2024-12-06 NOTE — TELEPHONE ENCOUNTER
Patient states she is unable to make it to  note, requests it faxed to [phone]. E-fax attempted.

## 2024-12-09 ENCOUNTER — RESULTS FOLLOW-UP (OUTPATIENT)
Dept: INTERNAL MEDICINE CLINIC | Facility: CLINIC | Age: 44
End: 2024-12-09

## 2024-12-09 NOTE — TELEPHONE ENCOUNTER
----- Message from DIPTI Apodaca sent at 12/9/2024  7:06 AM EST -----  Can you let Pili know her x ray did show cervical lordosis which can be seen with a muscle spasm. How is she feeling??

## 2024-12-09 NOTE — TELEPHONE ENCOUNTER
Called patient left voicemail to call the office to get xray results.  Please relay message to patient when she calls the office.

## 2024-12-14 DIAGNOSIS — M54.2 NECK PAIN: ICD-10-CM

## 2024-12-18 RX ORDER — CYCLOBENZAPRINE HCL 10 MG
10 TABLET ORAL
Qty: 30 TABLET | Refills: 0 | Status: SHIPPED | OUTPATIENT
Start: 2024-12-18

## 2025-01-02 PROBLEM — J01.10 ACUTE NON-RECURRENT FRONTAL SINUSITIS: Status: RESOLVED | Noted: 2024-12-03 | Resolved: 2025-01-02

## 2025-01-09 DIAGNOSIS — F32.A ANXIETY AND DEPRESSION: ICD-10-CM

## 2025-01-09 DIAGNOSIS — F41.9 ANXIETY AND DEPRESSION: ICD-10-CM

## 2025-01-10 RX ORDER — ESCITALOPRAM OXALATE 20 MG/1
20 TABLET ORAL DAILY
Qty: 90 TABLET | Refills: 1 | Status: SHIPPED | OUTPATIENT
Start: 2025-01-10

## 2025-04-02 ENCOUNTER — OFFICE VISIT (OUTPATIENT)
Dept: INTERNAL MEDICINE CLINIC | Facility: CLINIC | Age: 45
End: 2025-04-02

## 2025-04-02 VITALS — TEMPERATURE: 96.9 F | HEART RATE: 82 BPM | OXYGEN SATURATION: 99 %

## 2025-04-02 DIAGNOSIS — J06.9 UPPER RESPIRATORY TRACT INFECTION, UNSPECIFIED TYPE: Primary | ICD-10-CM

## 2025-04-02 PROCEDURE — 99213 OFFICE O/P EST LOW 20 MIN: CPT | Performed by: NURSE PRACTITIONER

## 2025-04-02 RX ORDER — AZITHROMYCIN 250 MG/1
TABLET, FILM COATED ORAL
Qty: 6 TABLET | Refills: 0 | Status: SHIPPED | OUTPATIENT
Start: 2025-04-02 | End: 2025-04-06

## 2025-04-02 NOTE — PROGRESS NOTES
Name: Pili Santana      : 1980      MRN: [id number]  Encounter Provider: DIPTI Love  Encounter Date: 2025   Encounter department: [facility]  :  Assessment & Plan  Upper respiratory tract infection, unspecified type    Orders:    azithromycin (ZITHROMAX) 250 mg tablet; Take 2 tablets today then 1 tablet daily x 4 days    Will start on a Z pack take as directed. Take Delsym for cough. Take Tylenol or Motrin for pain or fever. Will follow up for her wellness       History of Present Illness   Pili is for an acute visit. For around one week she is having cough, congestion, sore throat and ear pain. Denies any fever. She states she is the only sick at home. She feels like she is not getting any better. She offers no other issues.      Review of Systems   HENT:  Positive for congestion, postnasal drip and sore throat.    Respiratory:  Positive for cough.    All other systems reviewed and are negative.      Objective   There were no vitals taken for this visit.     Physical Exam  Vitals reviewed.   Constitutional:       Appearance: Normal appearance. She is normal weight.   HENT:      Right Ear: Tympanic membrane, ear canal and external ear normal.      Left Ear: Tympanic membrane, ear canal and external ear normal.      Nose: Congestion present.      Mouth/Throat:      Mouth: Mucous membranes are moist.      Pharynx: Oropharynx is clear.   Cardiovascular:      Rate and Rhythm: Normal rate and regular rhythm.      Pulses: Normal pulses.      Heart sounds: Normal heart sounds.   Pulmonary:      Effort: Pulmonary effort is normal.      Breath sounds: Normal breath sounds.   Skin:     General: Skin is warm and dry.      Capillary Refill: Capillary refill takes less than 2 seconds.   Neurological:      General: No focal deficit present.      Mental Status: She is alert and oriented to person, place, and time. Mental status is at baseline.   Psychiatric:          Mood and Affect: Mood normal.         Behavior: Behavior normal.         Thought Content: Thought content normal.         Judgment: Judgment normal.

## 2025-05-28 DIAGNOSIS — I10 PRIMARY HYPERTENSION: ICD-10-CM

## 2025-05-29 RX ORDER — VALSARTAN AND HYDROCHLOROTHIAZIDE 320; 12.5 MG/1; MG/1
1 TABLET, FILM COATED ORAL DAILY
Qty: 30 TABLET | Refills: 5 | Status: SHIPPED | OUTPATIENT
Start: 2025-05-29

## 2025-07-27 DIAGNOSIS — F41.9 ANXIETY AND DEPRESSION: ICD-10-CM

## 2025-07-27 DIAGNOSIS — F32.A ANXIETY AND DEPRESSION: ICD-10-CM

## 2025-07-29 RX ORDER — ESCITALOPRAM OXALATE 20 MG/1
20 TABLET ORAL DAILY
Qty: 90 TABLET | Refills: 1 | Status: SHIPPED | OUTPATIENT
Start: 2025-07-29